# Patient Record
Sex: MALE | Race: WHITE | NOT HISPANIC OR LATINO | Employment: FULL TIME | URBAN - METROPOLITAN AREA
[De-identification: names, ages, dates, MRNs, and addresses within clinical notes are randomized per-mention and may not be internally consistent; named-entity substitution may affect disease eponyms.]

---

## 2017-10-18 ENCOUNTER — HOSPITAL ENCOUNTER (OUTPATIENT)
Dept: FAMILY MEDICINE CLINIC | Facility: CLINIC | Age: 62
Setting detail: SPECIMEN
Discharge: HOME | End: 2017-10-18
Attending: INTERNAL MEDICINE | Admitting: INTERNAL MEDICINE

## 2017-10-18 LAB
ALBUMIN SERPL-MCNC: 4.1 G/DL (ref 3.5–4.8)
ALBUMIN/GLOB SERPL: 1.8 {RATIO} (ref 1–1.7)
ALP SERPL-CCNC: 51 IU/L (ref 32–91)
ALT SERPL-CCNC: 23 IU/L (ref 17–63)
ANION GAP SERPL CALC-SCNC: 11.4 MMOL/L (ref 10–20)
AST SERPL-CCNC: 20 IU/L (ref 15–41)
BILIRUB SERPL-MCNC: 0.9 MG/DL (ref 0.3–1.2)
BUN SERPL-MCNC: 11 MG/DL (ref 8–20)
BUN/CREAT SERPL: 10 (ref 6.2–20.3)
CALCIUM SERPL-MCNC: 9.7 MG/DL (ref 8.9–10.3)
CHLORIDE SERPL-SCNC: 103 MMOL/L (ref 101–111)
CHOLEST SERPL-MCNC: 188 MG/DL
CHOLEST/HDLC SERPL: 3.7 {RATIO}
CK SERPL-CCNC: 76 IU/L (ref 49–397)
CONV CO2: 31 MMOL/L (ref 22–32)
CONV LDL CHOLESTEROL DIRECT: 109 MG/DL (ref 0–100)
CONV TOTAL PROTEIN: 6.4 G/DL (ref 6.1–7.9)
CREAT UR-MCNC: 1.1 MG/DL (ref 0.7–1.2)
GLOBULIN UR ELPH-MCNC: 2.3 G/DL (ref 2.5–3.8)
GLUCOSE SERPL-MCNC: 105 MG/DL (ref 65–99)
HDLC SERPL-MCNC: 51 MG/DL
LDLC/HDLC SERPL: 2.1 {RATIO}
LIPID INTERPRETATION: ABNORMAL
POTASSIUM SERPL-SCNC: 4.4 MMOL/L (ref 3.6–5.1)
SODIUM SERPL-SCNC: 141 MMOL/L (ref 136–144)
TRIGL SERPL-MCNC: 164 MG/DL
VIT B12 SERPL-MCNC: 316 PG/ML (ref 180–914)
VLDLC SERPL CALC-MCNC: 28.8 MG/DL

## 2018-04-16 ENCOUNTER — HOSPITAL ENCOUNTER (OUTPATIENT)
Dept: FAMILY MEDICINE CLINIC | Facility: CLINIC | Age: 63
Setting detail: SPECIMEN
Discharge: HOME | End: 2018-04-16
Attending: INTERNAL MEDICINE | Admitting: INTERNAL MEDICINE

## 2018-04-16 LAB
ALBUMIN SERPL-MCNC: 4 G/DL (ref 3.5–4.8)
ALBUMIN/GLOB SERPL: 1.5 {RATIO} (ref 1–1.7)
ALP SERPL-CCNC: 49 IU/L (ref 32–91)
ALT SERPL-CCNC: 45 IU/L (ref 17–63)
ANION GAP SERPL CALC-SCNC: 10 MMOL/L (ref 10–20)
AST SERPL-CCNC: 31 IU/L (ref 15–41)
BASOPHILS # BLD AUTO: 0 10*3/UL (ref 0–0.2)
BASOPHILS NFR BLD AUTO: 1 % (ref 0–2)
BILIRUB SERPL-MCNC: 0.5 MG/DL (ref 0.3–1.2)
BUN SERPL-MCNC: 14 MG/DL (ref 8–20)
BUN/CREAT SERPL: 14 (ref 6.2–20.3)
CALCIUM SERPL-MCNC: 9.3 MG/DL (ref 8.9–10.3)
CHLORIDE SERPL-SCNC: 106 MMOL/L (ref 101–111)
CHOLEST SERPL-MCNC: 165 MG/DL
CHOLEST/HDLC SERPL: 3.5 {RATIO}
CONV CO2: 28 MMOL/L (ref 22–32)
CONV LDL CHOLESTEROL DIRECT: 86 MG/DL (ref 0–100)
CONV TOTAL PROTEIN: 6.6 G/DL (ref 6.1–7.9)
CREAT UR-MCNC: 1 MG/DL (ref 0.7–1.2)
DIFFERENTIAL METHOD BLD: (no result)
EOSINOPHIL # BLD AUTO: 0.1 10*3/UL (ref 0–0.3)
EOSINOPHIL # BLD AUTO: 2 % (ref 0–3)
ERYTHROCYTE [DISTWIDTH] IN BLOOD BY AUTOMATED COUNT: 12.3 % (ref 11.5–14.5)
GLOBULIN UR ELPH-MCNC: 2.6 G/DL (ref 2.5–3.8)
GLUCOSE SERPL-MCNC: 98 MG/DL (ref 65–99)
HCT VFR BLD AUTO: 41.9 % (ref 40–54)
HDLC SERPL-MCNC: 47 MG/DL
HGB BLD-MCNC: 14.1 G/DL (ref 14–18)
IRON SERPL-MCNC: 41 UG/DL (ref 45–182)
LDLC/HDLC SERPL: 1.8 {RATIO}
LIPID INTERPRETATION: ABNORMAL
LYMPHOCYTES # BLD AUTO: 1.3 10*3/UL (ref 0.8–4.8)
LYMPHOCYTES NFR BLD AUTO: 27 % (ref 18–42)
MCH RBC QN AUTO: 30.7 PG (ref 26–32)
MCHC RBC AUTO-ENTMCNC: 33.7 G/DL (ref 32–36)
MCV RBC AUTO: 91.1 FL (ref 80–94)
MONOCYTES # BLD AUTO: 0.6 10*3/UL (ref 0.1–1.3)
MONOCYTES NFR BLD AUTO: 12 % (ref 2–11)
NEUTROPHILS # BLD AUTO: 2.9 10*3/UL (ref 2.3–8.6)
NEUTROPHILS NFR BLD AUTO: 58 % (ref 50–75)
NRBC BLD AUTO-RTO: 0 /100{WBCS}
NRBC/RBC NFR BLD MANUAL: 0 10*3/UL
PLATELET # BLD AUTO: 182 10*3/UL (ref 150–450)
PMV BLD AUTO: 8.5 FL (ref 7.4–10.4)
POTASSIUM SERPL-SCNC: 4 MMOL/L (ref 3.6–5.1)
RBC # BLD AUTO: 4.6 10*6/UL (ref 4.6–6)
SODIUM SERPL-SCNC: 140 MMOL/L (ref 136–144)
TRIGL SERPL-MCNC: 116 MG/DL
VLDLC SERPL CALC-MCNC: 31.6 MG/DL
WBC # BLD AUTO: 4.9 10*3/UL (ref 4.5–11.5)

## 2018-11-09 ENCOUNTER — HOSPITAL ENCOUNTER (OUTPATIENT)
Dept: FAMILY MEDICINE CLINIC | Facility: CLINIC | Age: 63
Setting detail: SPECIMEN
Discharge: HOME | End: 2018-11-09
Attending: INTERNAL MEDICINE | Admitting: INTERNAL MEDICINE

## 2018-11-09 LAB
ALBUMIN SERPL-MCNC: 4.1 G/DL (ref 3.5–4.8)
ALBUMIN/GLOB SERPL: 1.5 {RATIO} (ref 1–1.7)
ALP SERPL-CCNC: 54 IU/L (ref 32–91)
ALT SERPL-CCNC: 21 IU/L (ref 17–63)
ANION GAP SERPL CALC-SCNC: 10 MMOL/L (ref 10–20)
AST SERPL-CCNC: 21 IU/L (ref 15–41)
BASOPHILS # BLD AUTO: 0.1 10*3/UL (ref 0–0.2)
BASOPHILS NFR BLD AUTO: 1 % (ref 0–2)
BILIRUB SERPL-MCNC: 1.2 MG/DL (ref 0.3–1.2)
BUN SERPL-MCNC: 13 MG/DL (ref 8–20)
BUN/CREAT SERPL: 13 (ref 6.2–20.3)
CALCIUM SERPL-MCNC: 9.4 MG/DL (ref 8.9–10.3)
CHLORIDE SERPL-SCNC: 103 MMOL/L (ref 101–111)
CHOLEST SERPL-MCNC: 181 MG/DL
CHOLEST/HDLC SERPL: 4.2 {RATIO}
CONV CO2: 31 MMOL/L (ref 22–32)
CONV LDL CHOLESTEROL DIRECT: 102 MG/DL (ref 0–100)
CONV TOTAL PROTEIN: 6.9 G/DL (ref 6.1–7.9)
CREAT UR-MCNC: 1 MG/DL (ref 0.7–1.2)
DIFFERENTIAL METHOD BLD: (no result)
EOSINOPHIL # BLD AUTO: 0.1 10*3/UL (ref 0–0.3)
EOSINOPHIL # BLD AUTO: 2 % (ref 0–3)
ERYTHROCYTE [DISTWIDTH] IN BLOOD BY AUTOMATED COUNT: 12.5 % (ref 11.5–14.5)
GLOBULIN UR ELPH-MCNC: 2.8 G/DL (ref 2.5–3.8)
GLUCOSE SERPL-MCNC: 96 MG/DL (ref 65–99)
HCT VFR BLD AUTO: 42.6 % (ref 40–54)
HDLC SERPL-MCNC: 43 MG/DL
HGB BLD-MCNC: 14.7 G/DL (ref 14–18)
IRON SERPL-MCNC: 92 UG/DL (ref 45–182)
LDLC/HDLC SERPL: 2.4 {RATIO}
LIPID INTERPRETATION: ABNORMAL
LYMPHOCYTES # BLD AUTO: 1.8 10*3/UL (ref 0.8–4.8)
LYMPHOCYTES NFR BLD AUTO: 31 % (ref 18–42)
MCH RBC QN AUTO: 31.5 PG (ref 26–32)
MCHC RBC AUTO-ENTMCNC: 34.6 G/DL (ref 32–36)
MCV RBC AUTO: 91.1 FL (ref 80–94)
MONOCYTES # BLD AUTO: 0.5 10*3/UL (ref 0.1–1.3)
MONOCYTES NFR BLD AUTO: 8 % (ref 2–11)
NEUTROPHILS # BLD AUTO: 3.3 10*3/UL (ref 2.3–8.6)
NEUTROPHILS NFR BLD AUTO: 58 % (ref 50–75)
NRBC BLD AUTO-RTO: 0 /100{WBCS}
NRBC/RBC NFR BLD MANUAL: 0 10*3/UL
PLATELET # BLD AUTO: 243 10*3/UL (ref 150–450)
PMV BLD AUTO: 9 FL (ref 7.4–10.4)
POTASSIUM SERPL-SCNC: 4 MMOL/L (ref 3.6–5.1)
RBC # BLD AUTO: 4.67 10*6/UL (ref 4.6–6)
SODIUM SERPL-SCNC: 140 MMOL/L (ref 136–144)
TRIGL SERPL-MCNC: 194 MG/DL
VLDLC SERPL CALC-MCNC: 35.6 MG/DL
WBC # BLD AUTO: 5.8 10*3/UL (ref 4.5–11.5)

## 2019-05-17 ENCOUNTER — HOSPITAL ENCOUNTER (OUTPATIENT)
Dept: FAMILY MEDICINE CLINIC | Facility: CLINIC | Age: 64
Setting detail: SPECIMEN
Discharge: HOME | End: 2019-05-17
Attending: INTERNAL MEDICINE | Admitting: INTERNAL MEDICINE

## 2019-05-17 LAB
ALBUMIN SERPL-MCNC: 4.2 G/DL (ref 3.5–4.8)
ALBUMIN/GLOB SERPL: 1.7 {RATIO} (ref 1–1.7)
ALP SERPL-CCNC: 47 IU/L (ref 32–91)
ALT SERPL-CCNC: 30 IU/L (ref 17–63)
ANION GAP SERPL CALC-SCNC: 16.2 MMOL/L (ref 10–20)
AST SERPL-CCNC: 28 IU/L (ref 15–41)
BILIRUB SERPL-MCNC: 1 MG/DL (ref 0.3–1.2)
BUN SERPL-MCNC: 16 MG/DL (ref 8–20)
BUN/CREAT SERPL: 16 (ref 6.2–20.3)
CALCIUM SERPL-MCNC: 9.3 MG/DL (ref 8.9–10.3)
CHLORIDE SERPL-SCNC: 104 MMOL/L (ref 101–111)
CHOLEST SERPL-MCNC: 169 MG/DL
CHOLEST/HDLC SERPL: 3.9 {RATIO}
CONV CO2: 24 MMOL/L (ref 22–32)
CONV LDL CHOLESTEROL DIRECT: 105 MG/DL (ref 0–100)
CONV TOTAL PROTEIN: 6.7 G/DL (ref 6.1–7.9)
CREAT UR-MCNC: 1 MG/DL (ref 0.7–1.2)
GLOBULIN UR ELPH-MCNC: 2.5 G/DL (ref 2.5–3.8)
GLUCOSE SERPL-MCNC: 101 MG/DL (ref 65–99)
HDLC SERPL-MCNC: 44 MG/DL
LDLC/HDLC SERPL: 2.4 {RATIO}
LIPID INTERPRETATION: ABNORMAL
POTASSIUM SERPL-SCNC: 4.2 MMOL/L (ref 3.6–5.1)
PSA SERPL-MCNC: 0.61 NG/ML (ref 0–4)
SODIUM SERPL-SCNC: 140 MMOL/L (ref 136–144)
TRIGL SERPL-MCNC: 132 MG/DL
VLDLC SERPL CALC-MCNC: 19.9 MG/DL

## 2019-05-24 ENCOUNTER — CONVERSION ENCOUNTER (OUTPATIENT)
Dept: FAMILY MEDICINE CLINIC | Facility: CLINIC | Age: 64
End: 2019-05-24

## 2019-06-04 VITALS
HEART RATE: 74 BPM | DIASTOLIC BLOOD PRESSURE: 72 MMHG | RESPIRATION RATE: 12 BRPM | HEIGHT: 67 IN | SYSTOLIC BLOOD PRESSURE: 108 MMHG | WEIGHT: 171 LBS | OXYGEN SATURATION: 97 % | BODY MASS INDEX: 26.84 KG/M2

## 2019-06-06 NOTE — PROGRESS NOTES
Vital Signs:    Patient Profile:    63 Years Old Male  Height:     67 inches  Weight:     171.0 pounds  BMI:        26.78     O2 Sat:     97 %  Temp:       98.5 degrees F oral  Pulse rate: 74 / minute  Resp:       12 per minute  BP Sittin / 72  (left arm)    Cuff size:  regular      Problems: Active problems were reviewed with the patient during this visit.  Medications: Medications were reviewed with the patient during this visit.  Allergies: Allergies were reviewed with the patient during this visit.  No Known Allergy.        Vitals Entered By: Heather Caicedo CMA (May 24, 2019 3:12 PM)      Primary Provider:  Chayito Freedman    CC:  6 mos f/u of hpld.    History of Present Illness:  here to f/u lab results         The patient comes in today for regular hyperlipidemia follow-up.  The patient denies muscle aches, constipation, diarrhea, GI upset and fatigue.  Patient denies any occurrences of chest pain/pressure, exercise intolerance, dyspnea, palpitations,   syncope and pedal edema.  Medication compliance noted as near 100%.  Dietary compliance since last visit noted as good.      also wife notes trouble with snoring  doesn't think he stops breathing    at last visit, had increased omeprazole to 40mg  sometimes still gets breakthrough  parish if he eats late or eats a large portion      Past Medical History:     Reviewed history from 2016 and no changes required:        Hyperlipidemia        E.D.        Iron deficiency        Palpitations (Stress treadmill test (summer 2015))        Cervical DJD        GERD                colonoscopy - 2015, +1-2 polyps; due q5 years ?    Past Surgical History:     Reviewed history from 2016 and no changes required:        none    Family History Summary:      Reviewed history Last on 2018 and no changes required:2019  Brother - Has Family History of High Cholesterol - Entered On: 2016  Sister - Has Family History of Other Medical Problems - bowel  problems - Entered On: 4/27/2016  Father - Has Family History of Heart Disease - Entered On: 4/27/2016    General Comments - FH:  Mother: healthy  Children: 2 - healthy  Siblings: 5 total - 1 sister healthy, 2 brothers healthy    Denies FH colon or prostate cancers    Social History:     Reviewed history from 04/25/2016 and no changes required:        Marital status:         Occupation:         Living arrangements: lives with wife        Risk Factors:     Smoked Tobacco Use:  Never smoker  Smokeless Tobacco Use:  Never  Drug use:  no  Alcohol use:  yes     Type:  beer    Previous Tobacco Use: Signed On - 11/28/2018  Smoked Tobacco Use:  Never smoker  Smokeless Tobacco Use:  Never  Drug use:  no    Previous Alcohol Use: Signed On - 11/28/2018  Alcohol use:  yes     Type:  beer    Colonoscopy History:     Date of Last Colonoscopy:  12/01/2015        Physical Exam   Height:  67  Weight:  170.8  BP:  108/72 mm HG    Medication List:  SILDENAFIL CITRATE 20 MG ORAL TABLET (SILDENAFIL CITRATE) 1-5 po pre intercourse  VIAGRA 100 MG ORAL TABLET (SILDENAFIL CITRATE) one prn as directed  SIMVASTATIN 20 MG ORAL TABLET (SIMVASTATIN) 1/2 tab daily  OMEPRAZOLE 40 MG ORAL CAPSULE DELAYED RELEASE (OMEPRAZOLE) one cap daily  CENTRUM SILVER ULTRA MENS ORAL TABLET (MULTIPLE VITAMINS-MINERALS) 1poqd  CHONDROITIN SULFATE CAPSULE (CHONDROITIN SULFATE A CAPS) 1pobid 600mg caps      Surgical History   none,    Risk Factors  Tobacco Use: Never smoker  Illicit Drug use: no      Physical Examination   General Appearance   In no acute distress.  Alert & oriented.  Behavior and affect appropriate to situation  HEENT   PERRLA, EOMI, TM's normal.  Pharynx clear  Cardiovascular   Regular rate and rhythm  Lungs   Clear to auscultation        Impression & Recommendations:    Problem # 1:  HYPERLIPIDEMIA (ICD-272.4) (VTS51-I26.5)  Assessment: Improved  better this time  if still these labs in 6 mo, may consider dc statin  but if back  up, then will likely continue  His updated medication list for this problem includes:     Simvastatin 20 Mg Oral Tablet (Simvastatin) ..... 1/2 tab daily      Problem # 2:  GERD (ICD-530.81) (OYQ14-T51.9)  Assessment: Deteriorated  discussed pros/cons of staying on ppi vs switching to h2 blocker  at this point, if he still gets breakthrough with ppi, likely will not be controlled with h2 blocker  discussed risk of b12 deficiency and renal disease and dementia with ppi  vs taking h2 blocker and being more strict with diet and portion size  pt elects to stay on 40mg of ppi at this time    Problem # 3:  Snoring (ICD-786.09) (EIT43-K50.83)  Assessment: New  refer to sleep medicine    Medications Added to Medication List This Visit:  1)  Simvastatin 20 Mg Oral Tablet (Simvastatin) .... 1/2 tab daily      Patient Instructions:  1)  rtc 6 mo for physical with labs 1 week prior  2)  refilled meds                Medication Administration    Orders Added:  1)  Ofc Vst, Est Level III [72820]          Medications:  SIMVASTATIN 20 MG ORAL TABLET (SIMVASTATIN) 1/2 tab daily  #45[Tablet] x 3      Entered and Authorized by:  Chayito Freedman      Electronically signed by:   Chayito Freedman on 05/24/2019      Method used:    Electronically to               [facility]* (mail-order)              [address] [place]  [address]              Ph: [phone]              Fax: [phone]      Note to Pharmacy: Route: ORAL;       RxID:   [id number]  OMEPRAZOLE 40 MG ORAL CAPSULE DELAYED RELEASE (OMEPRAZOLE) one cap daily  #90[Capsule] x 3      Entered and Authorized by:  Chayito Freedman      Electronically signed by:   Chayito Freedman on 05/24/2019      Method used:    Electronically to               [facility]* (mail-order)              [address] [place]  [address]              Ph: [phone]              Fax: [phone]      RxID:    [id number]  SILDENAFIL CITRATE 20 MG ORAL TABLET (SILDENAFIL CITRATE) 1-5 po pre intercourse  #50[Tablet] x 5      Entered and Authorized by:  Chayito Freedman      Electronically signed by:   Chayito Freedman on 05/24/2019      Method used:    Electronically to               [facility]* (retail)              [address] [place] [address]  [address]              Ph: [phone]              Fax: [phone]      RxID:   [id number]          Electronically signed by Chayito Freedman on 05/24/2019 at 6:53 PM  ________________________________________________________________________       Disclaimer: Converted Note message may not contain all data elements that existed in the [employer] source system. Please see [employer] for the original note details.

## 2019-07-29 ENCOUNTER — OFFICE VISIT (OUTPATIENT)
Dept: SLEEP MEDICINE | Facility: HOSPITAL | Age: 64
End: 2019-07-29

## 2019-07-29 VITALS
HEIGHT: 67 IN | WEIGHT: 172.4 LBS | BODY MASS INDEX: 27.06 KG/M2 | DIASTOLIC BLOOD PRESSURE: 70 MMHG | SYSTOLIC BLOOD PRESSURE: 109 MMHG | OXYGEN SATURATION: 98 % | HEART RATE: 76 BPM

## 2019-07-29 DIAGNOSIS — G47.30 SLEEP APNEA, UNSPECIFIED TYPE: Primary | ICD-10-CM

## 2019-07-29 PROCEDURE — G0463 HOSPITAL OUTPT CLINIC VISIT: HCPCS

## 2019-07-29 RX ORDER — OMEPRAZOLE 40 MG/1
40 CAPSULE, DELAYED RELEASE ORAL DAILY
COMMUNITY
End: 2019-11-09 | Stop reason: SDUPTHER

## 2019-07-29 RX ORDER — SIMVASTATIN 10 MG
10 TABLET ORAL NIGHTLY
COMMUNITY
End: 2019-12-09 | Stop reason: SDUPTHER

## 2019-07-29 RX ORDER — SILDENAFIL 100 MG/1
100 TABLET, FILM COATED ORAL DAILY PRN
COMMUNITY
End: 2019-12-09

## 2019-07-29 NOTE — PROGRESS NOTES
"  SLEEP MEDICINE CONSULT      Patient Identification:     Name: Sourav Proctor   Age: 63 y.o.   Gender: male   : 1955   MRN: [id number]     Date of consult: 2019    PCP/Referring Physician(s):     PCP: Chayito Freedman MD  Referring Provider: Provider, No Known      Chief Complaint:     Nocturnal snores associated with daytime easy fatigability and hypersomnolence.  History of Present Illness:   This is a very pleasant gentleman who has snored for a long time.  Snores has become much more loud and disruptive so much so that he has to move to a different bedroom.  His wife has not noticed any breathing difficulties except that he has been breathing \"heavy\".  The patient suspects that he has sleep apnea.  He would prefer a home study.    His bedtime nowadays is 10 PM.  Takes him less than 15 minutes to fall asleep.  He usually wakes up at 5 in the morning to start his day.  He watches TV in bed in order to relax and dozes off.  He has woken up 1-3 times a night.  Sometimes makes a trip to the bathroom.  He usually 3 times per week.  Any fluctuation in temperature in his bedroom has disturbed his sleep.  He has sometimes woken up with \"soreness of hips\".  He has often woken up with with heartburn.  Denies any symptoms of headache or sinus congestion at night on the morning.  He denies symptoms of night sweats.  He does not drool in his sleep.  He denies any symptoms of dry mouth at night or in the morning.  No nasal congestion at night or in the morning.  He has never woken up from his own snores.    He occasionally has difficult time falling asleep at night or staying asleep.  Sometimes he has sleepless nights PA.  Usually once or twice per week.  He attributes his insomnia to his mind that continues to work.  He finds it difficult to shut his mind off.  Has taken over-the-counter Benadryl 2-3 times per week in order to fall asleep.    He is a restless sleeper toss and turn at night.  Denies any " symptoms of nightmares.  He has vivid dreams.  He has never talked about in his sleep.  He is a restless sleeper.  He denies any symptoms of bruxism at night or in the morning leg cramps at night or in the morning.  His legs does not twitch at night.  He has felt the soles of his feet warm/hot at night.  He denies any symptoms of hypnopompic or hypnagogic hallucinations.    He has woken up in the morning somewhat tired and somnolent.  He likes to drink 2 or 3 cups of coffee in the morning.  Iced tea during the daytime.  He tends to doze off during sedentary activities.  There is a high chance of dozing off if he is sitting and reading or watching TV.  He can easily doze off if he is a passenger in a car for an hour without a break or laying down to rest when circumstances permit him to do so.  There is a minimal chance of dozing off if he sitting inactive in a public place.  Or sitting quietly after lunch.  His Frakes sleepiness score is 14/24.    Allergies, Medications, and Past History:   Allergies:    Allergies no known allergies  Current Medications:    Prior to Admission medications    Medication Sig Start Date End Date Taking? Authorizing Provider   omeprazole (priLOSEC) 40 MG capsule Take 40 mg by mouth Daily.   Yes Clinton Iyer MD   sildenafil (VIAGRA) 100 MG tablet Take 100 mg by mouth Daily As Needed for erectile dysfunction.   Yes Clinton Iyer MD   simvastatin (ZOCOR) 10 MG tablet Take 10 mg by mouth Every Night.   Yes Clinton Iyer MD     Past Medical History:    Past Medical History:   Diagnosis Date   • Anemia, iron deficiency    • DJD (degenerative joint disease) of cervical spine    • GERD (gastroesophageal reflux disease)    • Hypercholesterolemia       Past Surgical History:    History reviewed. No pertinent surgical history.   Social History:    Social History     Tobacco Use   • Smoking status: Never Smoker   • Smokeless tobacco: Never Used   Substance Use Topics   •  "Alcohol use: Not on file   • Drug use: Not on file     Family Medical History:  No family history on file.      Review of Systems:   Constitutional:  Denies fever or chills and weight gain  Eyes:  Denies change in visual acuity   HENT:  Denies nasal congestion, sore throat or post nasal drainage   Respiratory:  Denies cough, shortness of breath or dyspnea on exertion    Cardiovascular:  Denies chest pain or edema   GI:  Denies abdominal pain, nausea, vomiting, bloody stools or diarrhea   :  Denies dysuria or nocturia   Musculoskeletal:  Denies back pain or joint pain   Integument:  Denies rash   Neurologic:  Denies headache, focal weakness or sensory changes. No history of stroke or seizures.   Endocrine:  Denies polyuria or polydipsia   Lymphatic:  Denies swollen glands   Psychiatric:  Denies depression, anxiety or claustrophobia      Physical Exam:     Vitals:    07/29/19 1447   BP: 109/70   Pulse: 76   SpO2: 98%   Weight: 78.2 kg (172 lb 6.4 oz)   Height: 170.2 cm (67\")     HEENT: Head is normocephalic, atraumatic, normal distribution of hair. Pupils reactive to light. Ocular movements intact. mild nasal congestion on sniff test. No deviated nasal septum. No micrognathia.  Throat: Mallapatti stage 4, tongue midline, mucosa moist.  Neck: no JVD, thyromegaly, mass, +midline trachea, Neck circumference 15 inches  Lungs: clear, no wheeze, rhonchi, crackles  Cardiovascular: S1, S2, RRR no murmurs, rubs or gallops  Abd: +BS's soft NT/ND, no CVA tenderness.    Ext: no edema, cyanosis, clubbing, pulses intact  Neuro: Awake alert, oriented to time place and person. Grossly intact to motor, sensory cerebral, and cerebellar (without focal deficit)  Psych: No overt rolly, depression, psychosis or inappropriate behavior  Skin: No rash, cellulitis, or ulcerations.  No facial rash or erosions      Assessment/Plan:    sleep apnea unspecified:  This is a very pleasant gentleman who has snored for a long time.  He has been " "breathing \"heavy\" at night.  He has sometimes woken up with a dry mouth.  He has felt tired and somnolent during the daytime.  Obstructive sleep apnea has to be ruled out.  He wants an attended polysomnogram.  Recommendation . an unattended polysomnogram is recommended.    2.  Allergic rhinitis.  He has remained symptomatic.  Recommendation.  Aggressive therapy is recommended.    3.  Sleep onset and sleep maintenance insomnia most likely related to underlying anxiety/depression.  Recommendation.  Aggressive therapy for underlying factors is recommended in order to control the symptoms of insomnia.    Driving instructions. Patient is advised to avoid driving if tired or somnolent. To avoid all alcoholic beverages or medications causing somnolence.         Diagnosis Plan   1. Sleep apnea, unspecified type  Home Sleep Study     No orders of the defined types were placed in this encounter.    Orders Placed This Encounter   Procedures   • Home Sleep Study     Standing Status:   Future     Standing Expiration Date:   7/29/2020     Order Specific Question:   May take own meds     Answer:   Yes     Order Specific Question:   If any contraindications for HST are checked, patient may be recommended for in-lab testing. HST is indicated for patients in whom TREY is  suspected diagnosis.     Answer:   Does not apply         Raina Keith MD  7/29/2019  3:13 PM    "

## 2019-08-05 ENCOUNTER — HOSPITAL ENCOUNTER (OUTPATIENT)
Dept: SLEEP MEDICINE | Facility: HOSPITAL | Age: 64
Discharge: HOME | End: 2019-08-05
Admitting: INTERNAL MEDICINE

## 2019-08-05 VITALS — BODY MASS INDEX: 27 KG/M2 | HEIGHT: 67 IN | WEIGHT: 172 LBS

## 2019-08-05 DIAGNOSIS — G47.30 SLEEP APNEA, UNSPECIFIED TYPE: ICD-10-CM

## 2019-08-05 PROCEDURE — 95806 SLEEP STUDY UNATT&RESP EFFT: CPT

## 2019-08-12 DIAGNOSIS — N52.9 ERECTILE DYSFUNCTION, UNSPECIFIED ERECTILE DYSFUNCTION TYPE: Primary | ICD-10-CM

## 2019-08-15 ENCOUNTER — TELEPHONE (OUTPATIENT)
Dept: SLEEP MEDICINE | Facility: HOSPITAL | Age: 64
End: 2019-08-15

## 2019-09-04 ENCOUNTER — OFFICE VISIT (OUTPATIENT)
Dept: SLEEP MEDICINE | Facility: HOSPITAL | Age: 64
End: 2019-09-04

## 2019-09-04 VITALS
DIASTOLIC BLOOD PRESSURE: 77 MMHG | WEIGHT: 171 LBS | SYSTOLIC BLOOD PRESSURE: 116 MMHG | BODY MASS INDEX: 26.84 KG/M2 | RESPIRATION RATE: 20 BRPM | HEIGHT: 67 IN | OXYGEN SATURATION: 97 % | HEART RATE: 79 BPM

## 2019-09-04 DIAGNOSIS — G47.33 OBSTRUCTIVE SLEEP APNEA, ADULT: Primary | ICD-10-CM

## 2019-09-04 PROCEDURE — G0463 HOSPITAL OUTPT CLINIC VISIT: HCPCS

## 2019-09-04 NOTE — PROGRESS NOTES
"  SLEEP MEDICINE CONSULT      Patient Identification:     Name: Sourav Proctor   Age: 63 y.o.   Gender: male   : 1955   MRN: [id number]     Date of consult: 2019    PCP/Referring Physician(s):     PCP: Chayiot Freemdan MD  Referring Provider: Raina Keith MD      Chief Complaint:     Follow-up after an   inattended polysomnogram.  History of Present Illness:   This is a very pleasant gentleman who has snored for a long time.  Snores has become much more loud and disruptive so much so that he has to move to a different bedroom.  His wife has not noticed any breathing difficulties except that he has been breathing \"heavy\".  He underwent an unattended polysomnogram.  He here today to discuss the results of the study.      His usual bedtime  is 10 PM.  Takes him less than 15 minutes to fall asleep.  He takes BENADRYL as a sleeping aid.  He slept poorly on the night of the study.  He did not take his sleeping aid.  He woke up at 1:30 AM and stayed up till 3:30 AM.  He stayed in bed with his monitoring equipment in place. He was very restless on the night of study. He tossed  And turned through out the night.  He woke up at 5 in the morning to start his day.      He has  woke up in morning tired and somewhat somnolent.  He has sometimes dozed off during sedentary activities.  He has felt much better after 10 to 15 minutes nap after work.  His concentration sometimes lapses at work.  He denies any symptoms of depression or anxiety though he finds it difficult to sleep at night.      Allergies, Medications, and Past History:   Allergies:    No Known Allergies  Current Medications:    Prior to Admission medications    Medication Sig Start Date End Date Taking? Authorizing Provider   omeprazole (priLOSEC) 40 MG capsule Take 40 mg by mouth Daily.   Yes Clinton Iyer MD   sildenafil (VIAGRA) 100 MG tablet Take 100 mg by mouth Daily As Needed for erectile dysfunction.   Yes Clinton Iyer MD " "  simvastatin (ZOCOR) 10 MG tablet Take 10 mg by mouth Every Night.   Yes Provider, Clinton, MD     Past Medical History:    Past Medical History:   Diagnosis Date   • Anemia, iron deficiency    • DJD (degenerative joint disease) of cervical spine    • GERD (gastroesophageal reflux disease)    • Hypercholesterolemia       Past Surgical History:    No past surgical history on file.   Social History:    Social History     Tobacco Use   • Smoking status: Never Smoker   • Smokeless tobacco: Never Used   Substance Use Topics   • Alcohol use: Not on file   • Drug use: Not on file     Family Medical History:  History reviewed. No pertinent family history.      Review of Systems:   Constitutional:  Denies fever or chills and weight gain  Eyes:  Denies change in visual acuity   HENT:  Denies nasal congestion, sore throat or post nasal drainage   Respiratory:  Denies cough, shortness of breath or dyspnea on exertion    Cardiovascular:  Denies chest pain or edema   GI:  Denies abdominal pain, nausea, vomiting, bloody stools or diarrhea   :  Denies dysuria or nocturia   Musculoskeletal:  Denies back pain or joint pain   Integument:  Denies rash   Neurologic:  Denies headache, focal weakness or sensory changes. No history of stroke or seizures.   Endocrine:  Denies polyuria or polydipsia   Lymphatic:  Denies swollen glands   Psychiatric:  Denies depression, anxiety or claustrophobia      Physical Exam:     Vitals:    09/04/19 1516   BP: 116/77   Pulse: 79   Resp: 20   SpO2: 97%   Weight: 77.6 kg (171 lb)   Height: 170.2 cm (67\")     HEENT: Head is normocephalic, atraumatic, normal distribution of hair. Pupils reactive to light. Ocular movements intact. mild nasal congestion on sniff test. No deviated nasal septum. No micrognathia.  Throat: Mallapatti stage 4, tongue midline, mucosa moist.  Neck: no JVD, thyromegaly, mass, +midline trachea, Neck circumference 15 inches  Lungs: clear, no wheeze, rhonchi, " crackles  Cardiovascular: S1, S2, RRR no murmurs, rubs or gallops  Abd: +BS's soft NT/ND, no CVA tenderness.    Ext: no edema, cyanosis, clubbing, pulses intact  Neuro: Awake alert, oriented to time place and person. Grossly intact to motor, sensory cerebral, and cerebellar (without focal deficit)  Psych: No overt rolly, depression, psychosis or inappropriate behavior  Skin: No rash, cellulitis, or ulcerations.  No facial rash or erosions      Assessment/Plan:   Obstructive sleep apnea:  This is a very pleasant gentleman who has snored for a long time.  He underwent unattended polysomnogram.  He was very restless on the night of the study.  He took his sleeping aid on the night of the study.  He tossed and turned during the night.  He stayed awake for more than 2 hours on the night of the study with the monitoring equipment in place.  He has felt tired and somnolent during the daytime.      The home study may underrepresent the severity of obstructive sleep apnea due to unaccounted periods of wakefulness during the night of study.  Results of the study was discussed in detail with the patient all questions were answered.  Different options of therapy discussed including CPAP/BiPAP titration.  To repeat a home study or empirically use of a CPAP mask.  The patient opted for an empirical use of CPAP mask.    Applications of untreated obstructive sleep apnea were also discussed including increased risk of stroke, coronary artery disease and presence of cardiac arrhythmias.  Possibility of dying and sleep.  Increased risk of pulmonary as well as systemic hypertension.  Short-term complications includes daytime hypersomnolence with possibility of dozing off during sedentary activities such as driving with all attendant complications.  Decreased concentration.  Issues with memory.  Mood issues.  Recommendation .  An auto CPAP mode of therapy is recommended with a pressure range between 5-15 CWP.  He may benefit from a full  facemask but he may use any mask which she finds comfortable.  He is advised to use the mask with given pressures a nightly basis.  He is advised to use the mask with given pressures for at least 4 hours a minimum benefit or as long as he sleeps for maximum benefit.  He is advised to use the mask with given pressures if he takes a nap during the daytime.  2.  Allergic rhinitis.  He has remained symptomatic.  Recommendation.  Aggressive therapy is recommended.    3.  Sleep onset and sleep maintenance insomnia most likely related to underlying anxiety/depression.  Recommendation.  Aggressive therapy for underlying factors is recommended in order to control the symptoms of insomnia.    Driving instructions. Patient is advised to avoid driving if tired or somnolent. To avoid all alcoholic beverages or medications causing somnolence.         Diagnosis Plan   1. Obstructive sleep apnea, adult  CPAP Therapy     No orders of the defined types were placed in this encounter.    Orders Placed This Encounter   Procedures   • CPAP Therapy     Order Specific Question:   Equipment     Answer:    CPAP     Order Specific Question:   PAP Machine Brand     Answer:   Respironics     Order Specific Question:   PAP Tubing (1 per 3 months)     Answer:    6' Standard tubing     Order Specific Question:   PAP Mask (1 per 3 months)     Answer:    Full face mask     Order Specific Question:   Mask interface (1 per month)     Answer:    Face Mask Interface     Order Specific Question:   PAP Accessories     Answer:    Water Chamber for PAP Humidifier (1 per 6 month) &  Filter PAP Disposable (2 per month) &  Filter PAP Non-Disposable (1 per 6 months) &  Chinstrap to be used with PAP (1 per 6 months) &  Headgear to be used with PAP (1 per 6 mo)     Order Specific Question:   The prescribed settings for the PAP ordered are     Answer:   auto cpap mode. pressure range from 5 to 15 cwp.     Order Specific  Question:   Does the patient have Obstructive Sleep Apnea or other qualifying diagnosis for PAP ordered?     Answer:   Yes     Order Specific Question:   Does the patient require humidification?     Answer:   Yes     Order Specific Question:   Humidifier     Answer:    Humidifier Heated for CPAP or BiPAP     Order Specific Question:   The patient requires a PAP Device & continued use of Supplies to administer effective PAP therapy at the frequency of use ordered above     Answer:   Yes     Order Specific Question:   Initial Supplies and refills authorized for 12 months?     Answer:   Yes     Order Specific Question:   The face to face evaluation was performed on     Answer:   9/4/2019     Order Specific Question:   Which [employer] company is this being sent to?     Answer:   [facility] [[id number]]     Order Specific Question:   Length of Need (99 Months = Lifetime)     Answer:   99 Months = Lifetime         Raina Keith MD  9/4/2019  4:50 PM

## 2019-11-11 RX ORDER — OMEPRAZOLE 40 MG/1
CAPSULE, DELAYED RELEASE ORAL
Qty: 90 CAPSULE | Refills: 3 | Status: SHIPPED | OUTPATIENT
Start: 2019-11-11 | End: 2019-12-09 | Stop reason: SDUPTHER

## 2019-11-13 ENCOUNTER — OFFICE VISIT (OUTPATIENT)
Dept: SLEEP MEDICINE | Facility: HOSPITAL | Age: 64
End: 2019-11-13

## 2019-11-13 VITALS
HEART RATE: 83 BPM | DIASTOLIC BLOOD PRESSURE: 78 MMHG | BODY MASS INDEX: 26.68 KG/M2 | OXYGEN SATURATION: 97 % | WEIGHT: 170 LBS | SYSTOLIC BLOOD PRESSURE: 117 MMHG | HEIGHT: 67 IN

## 2019-11-13 DIAGNOSIS — G47.33 OBSTRUCTIVE SLEEP APNEA, ADULT: Primary | ICD-10-CM

## 2019-11-13 PROCEDURE — G0463 HOSPITAL OUTPT CLINIC VISIT: HCPCS

## 2019-11-13 NOTE — PROGRESS NOTES
SLEEP MEDICINE CONSULT      Patient Identification:     Name: Sourav Proctor   Age: 63 y.o.   Gender: male   : 1955   MRN: [id number]     Date of consult: 2019    PCP/Referring Physician(s):     PCP: Chayito Freedman MD  Referring Provider: Raina Keith MD      Chief Complaint:     Obstructive sleep apnea.  Follow-up after 8 weeks for compliance.  History of Present Illness:   This is a very pleasant gentleman who underwent an unattended polysomnogram which documented presence of mild obstructive sleep apnea.  He was provided with an auto CPAP mode of therapy with a pressure range between 5-15.  He here today for initial follow-up for compliance.  Memory card has been downloaded.    He has used a full facemask which she finds somewhat uncomfortable.  He has tried nasal mask.  He found the nasal mask very uncomfortable.  He is a mouth breather.  Oral venting bothered him with the nasal mask.  Air leaks with a full facemask has woken him up at night.  Pressures are quite tolerable to him  He uses humidifier on regular basis.    He has slept much better with the mask and given pressures though his sleep is occasionally disturbed by air leaks around the mask.   His usual bedtime  is 10 PM.  Takes him less than 15 minutes to fall asleep.  He takes BENADRYL as a sleeping aid.    He tossed  And turned through out the night.  He woke up at 5 in the morning to start his day.  He denies any symptoms of headache at night or in the morning.  No heartburn at night or in the morning.  Decrease in severity of dry mouth.  Decreased symptoms of nasal congestion at night as well as in the morning.  He does not snore with the mask in place.    He has  woke up in morning awake alert and rested.  He usually does not take any naps during the working days.  He has dozed off on the weekend with his grandchild.       Allergies, Medications, and Past History:   Allergies:    No Known Allergies  Current Medications:   "  Prior to Admission medications    Medication Sig Start Date End Date Taking? Authorizing Provider   omeprazole (priLOSEC) 40 MG capsule Take 40 mg by mouth Daily.   Yes ProviderClinton MD   sildenafil (VIAGRA) 100 MG tablet Take 100 mg by mouth Daily As Needed for erectile dysfunction.   Yes Provider, MD Clinton   simvastatin (ZOCOR) 10 MG tablet Take 10 mg by mouth Every Night.   Yes Provider, MD Clinton     Past Medical History:    Past Medical History:   Diagnosis Date   • Anemia, iron deficiency    • DJD (degenerative joint disease) of cervical spine    • GERD (gastroesophageal reflux disease)    • Hypercholesterolemia    • Sleep apnea, obstructive       Past Surgical History:    No past surgical history on file.   Social History:    Social History     Tobacco Use   • Smoking status: Never Smoker   • Smokeless tobacco: Never Used   Substance Use Topics   • Alcohol use: Not on file   • Drug use: Not on file     Family Medical History:  History reviewed. No pertinent family history.      Review of Systems:   Constitutional:  Denies fever or chills and weight gain  Eyes:  Denies change in visual acuity   HENT:  Denies nasal congestion, sore throat or post nasal drainage   Respiratory:  Denies cough, shortness of breath or dyspnea on exertion    Cardiovascular:  Denies chest pain or edema   GI:  Denies abdominal pain, nausea, vomiting, bloody stools or diarrhea .  No aerophagia.  :  Denies dysuria or nocturia   Musculoskeletal:  Denies back pain or joint pain   Integument:  Denies rash   Neurologic:  Denies headache, focal weakness or sensory changes. No history of stroke or seizures.   Endocrine:  Denies polyuria or polydipsia   Lymphatic:  Denies swollen glands   Psychiatric:  Denies depression, anxiety or claustrophobia      Physical Exam:     Vitals:    11/13/19 1549   BP: 117/78   Pulse: 83   SpO2: 97%   Weight: 77.1 kg (170 lb)   Height: 170.2 cm (67\")     HEENT: Head is normocephalic, " atraumatic, normal distribution of hair. Pupils reactive to light. Ocular movements intact. mild nasal congestion on sniff test. No deviated nasal septum. No micrognathia.  Throat: Mallapatti stage 4, tongue midline, mucosa moist.  Neck: no JVD, thyromegaly, mass, +midline trachea, Neck circumference 15 inches  Lungs: clear, no wheeze, rhonchi, crackles  Cardiovascular: S1, S2, RRR no murmurs, rubs or gallops  Abd: +BS's soft NT/ND, no CVA tenderness.    Ext: no edema, cyanosis, clubbing, pulses intact  Neuro: Awake alert, oriented to time place and person. Grossly intact to motor, sensory cerebral, and cerebellar (without focal deficit)  Psych: No overt rolly, depression, psychosis or inappropriate behavior  Skin: No rash, cellulitis, or ulcerations.  No facial rash or erosions    Diagnostic data;  Memory card download shows data from 9/12/2019 to 11/12/2019.  Overall 62 days of data reviewed.  Percentage of use is 95%.  Maximum hours use 9 hours 16 minutes minimum time used 1 hour 50 minutes.  91.9% of the time the mask has been used for more than 4 hours.  The average apnea hypopnea index is 8.5 events per hour on an auto CPAP mode of therapy with a pressure range between 5-15 CWP.    Assessment/Plan:   Obstructive sleep apnea:  This is a very pleasant gentleman who underwent an unattended polysomnogram which showed presence of mild obstructive sleep apnea.  He was prescribed an auto CPAP mode of therapy.  The pressure range between 5-15 CWP.    He has remained compliant with therapy.  He is getting benefit from it.    The results of memory card download was discussed in detail with the patient.  All questions were answered.  He has difficulty tolerating the mask because of the air leaks around the mask as well as aerophagia.  The aerophagia may be due to upper airway congestion with swallowing of air.  Recommendation .  To increase the pressures of an auto CPAP mode of therapy to the  range between 8-20 CWP.  He  may benefit from a full facemask but he may use any mask which she finds comfortable.  He is advised to use the mask with given pressures a nightly basis.  He is advised to use the mask with given pressures for at least 4 hours a minimum benefit or as long as he sleeps for maximum benefit.  He is advised to use the mask with given pressures if he takes a nap during the daytime.  2.  Allergic rhinitis.  He has remained symptomatic.  His upper airway congestion is interfering with adequate therapy for obstructive sleep apnea.  Recommendation.  Aggressive therapy is recommended.   Advised to use a steroid nasal spray which he can get over-the-counter.  1 spray to each nostril at night.  He will benefit from allergy testing.    3.  Sleep onset and sleep maintenance insomnia most likely related to underlying anxiety/depression.  Recommendation.  Aggressive therapy for underlying factors is recommended in order to control the symptoms of insomnia.    Driving instructions. Patient is advised to avoid driving if tired or somnolent. To avoid all alcoholic beverages or medications causing somnolence.         Diagnosis Plan   1. Obstructive sleep apnea, adult  CPAP Therapy     No orders of the defined types were placed in this encounter.    Orders Placed This Encounter   Procedures   • CPAP Therapy     Order Specific Question:   PAP Machine Brand     Answer:   Respironics     Order Specific Question:   PAP Tubing (1 per 3 months)     Answer:    6' Standard tubing     Order Specific Question:   PAP Mask (1 per 3 months)     Answer:    Full face mask     Order Specific Question:   Mask interface (1 per month)     Answer:    Face Mask Interface     Order Specific Question:   PAP Accessories     Answer:    Water Chamber for PAP Humidifier (1 per 6 month) &  Filter PAP Disposable (2 per month) &  Filter PAP Non-Disposable (1 per 6 months) &  Chinstrap to be used with PAP (1 per 6 months) &   Headgear to be used with PAP (1 per 6 mo)     Order Specific Question:   The prescribed settings for the PAP ordered are     Answer:   please change auto-CPAP pressure to range 8 to 20 cwp.     Order Specific Question:   Does the patient have Obstructive Sleep Apnea or other qualifying diagnosis for PAP ordered?     Answer:   Yes     Order Specific Question:   Does the patient require humidification?     Answer:   Yes     Order Specific Question:   Humidifier     Answer:    Humidifier Heated for CPAP or BiPAP     Order Specific Question:   The patient requires a PAP Device & continued use of Supplies to administer effective PAP therapy at the frequency of use ordered above     Answer:   Yes     Order Specific Question:   Initial Supplies and refills authorized for 12 months?     Answer:   Yes     Order Specific Question:   The face to face evaluation was performed on     Answer:   11/13/2019     Order Specific Question:   Which [employer] company is this being sent to?     Answer:   [facility] [[id number]]     Order Specific Question:   Length of Need (99 Months = Lifetime)     Answer:   99 Months = Lifetime         Raina Keith MD  11/13/2019  4:41 PM

## 2019-11-25 ENCOUNTER — PATIENT MESSAGE (OUTPATIENT)
Dept: FAMILY MEDICINE CLINIC | Facility: CLINIC | Age: 64
End: 2019-11-25

## 2019-11-25 PROCEDURE — 80061 LIPID PANEL: CPT | Performed by: INTERNAL MEDICINE

## 2019-11-25 PROCEDURE — 85025 COMPLETE CBC W/AUTO DIFF WBC: CPT | Performed by: INTERNAL MEDICINE

## 2019-11-25 PROCEDURE — 82607 VITAMIN B-12: CPT | Performed by: INTERNAL MEDICINE

## 2019-11-25 PROCEDURE — 80053 COMPREHEN METABOLIC PANEL: CPT | Performed by: INTERNAL MEDICINE

## 2019-11-25 PROCEDURE — 84443 ASSAY THYROID STIM HORMONE: CPT | Performed by: INTERNAL MEDICINE

## 2019-12-06 PROBLEM — R06.83 SNORING: Status: ACTIVE | Noted: 2019-05-24

## 2019-12-06 PROBLEM — H52.4 BILATERAL PRESBYOPIA: Status: ACTIVE | Noted: 2019-12-06

## 2019-12-06 PROBLEM — R42 DIZZINESS: Status: ACTIVE | Noted: 2018-04-05

## 2019-12-06 PROBLEM — H16.9 KERATITIS: Status: ACTIVE | Noted: 2017-10-23

## 2019-12-06 PROBLEM — R53.83 FATIGUE: Status: ACTIVE | Noted: 2018-05-16

## 2019-12-06 PROBLEM — R22.9 SUBCUTANEOUS NODULE: Status: ACTIVE | Noted: 2017-04-24

## 2019-12-06 PROBLEM — H25.13 NUCLEAR SENILE CATARACT OF BOTH EYES: Status: ACTIVE | Noted: 2019-12-06

## 2019-12-06 PROBLEM — R59.0 INGUINAL LYMPHADENOPATHY: Status: ACTIVE | Noted: 2017-09-26

## 2019-12-06 PROBLEM — Z86.69 HISTORY OF RETINAL TEAR: Status: ACTIVE | Noted: 2019-12-06

## 2019-12-06 PROBLEM — H43.811 PVD (POSTERIOR VITREOUS DETACHMENT), RIGHT EYE: Status: ACTIVE | Noted: 2019-12-06

## 2019-12-06 PROBLEM — N40.0 BENIGN PROSTATIC HYPERPLASIA: Status: ACTIVE | Noted: 2018-04-05

## 2019-12-06 PROBLEM — H25.813 COMBINED FORMS OF AGE-RELATED CATARACT, BILATERAL: Status: ACTIVE | Noted: 2019-12-06

## 2019-12-06 PROBLEM — Z98.890 OTHER SPECIFIED POSTPROCEDURAL STATES: Status: ACTIVE | Noted: 2019-12-06

## 2019-12-06 PROBLEM — H35.89 OTHER SPECIFIED RETINAL DISORDERS: Status: ACTIVE | Noted: 2019-12-06

## 2019-12-06 PROBLEM — H43.813 VITREOUS DEGENERATION, BILATERAL: Status: ACTIVE | Noted: 2019-12-06

## 2019-12-09 ENCOUNTER — OFFICE VISIT (OUTPATIENT)
Dept: FAMILY MEDICINE CLINIC | Facility: CLINIC | Age: 64
End: 2019-12-09

## 2019-12-09 VITALS
SYSTOLIC BLOOD PRESSURE: 122 MMHG | HEIGHT: 67 IN | RESPIRATION RATE: 16 BRPM | BODY MASS INDEX: 26.71 KG/M2 | TEMPERATURE: 98.6 F | WEIGHT: 170.2 LBS | OXYGEN SATURATION: 98 % | HEART RATE: 79 BPM | DIASTOLIC BLOOD PRESSURE: 70 MMHG

## 2019-12-09 DIAGNOSIS — K21.9 GASTROESOPHAGEAL REFLUX DISEASE WITHOUT ESOPHAGITIS: ICD-10-CM

## 2019-12-09 DIAGNOSIS — Z00.00 PREVENTATIVE HEALTH CARE: Primary | ICD-10-CM

## 2019-12-09 DIAGNOSIS — L57.0 ACTINIC KERATOSES: ICD-10-CM

## 2019-12-09 DIAGNOSIS — E78.2 MIXED HYPERLIPIDEMIA: ICD-10-CM

## 2019-12-09 PROBLEM — R06.83 SNORING: Status: RESOLVED | Noted: 2019-05-24 | Resolved: 2019-12-09

## 2019-12-09 PROBLEM — R42 DIZZINESS: Status: RESOLVED | Noted: 2018-04-05 | Resolved: 2019-12-09

## 2019-12-09 PROBLEM — R59.0 INGUINAL LYMPHADENOPATHY: Status: RESOLVED | Noted: 2017-09-26 | Resolved: 2019-12-09

## 2019-12-09 PROBLEM — R53.83 FATIGUE: Status: RESOLVED | Noted: 2018-05-16 | Resolved: 2019-12-09

## 2019-12-09 PROBLEM — H35.89 OTHER SPECIFIED RETINAL DISORDERS: Status: RESOLVED | Noted: 2019-12-06 | Resolved: 2019-12-09

## 2019-12-09 PROBLEM — H16.9 KERATITIS: Status: RESOLVED | Noted: 2017-10-23 | Resolved: 2019-12-09

## 2019-12-09 PROBLEM — G47.33 OSA (OBSTRUCTIVE SLEEP APNEA): Status: ACTIVE | Noted: 2019-12-09

## 2019-12-09 PROBLEM — Z86.69 HISTORY OF RETINAL TEAR: Status: RESOLVED | Noted: 2019-12-06 | Resolved: 2019-12-09

## 2019-12-09 PROBLEM — R22.9 SUBCUTANEOUS NODULE: Status: RESOLVED | Noted: 2017-04-24 | Resolved: 2019-12-09

## 2019-12-09 PROCEDURE — 99396 PREV VISIT EST AGE 40-64: CPT | Performed by: NURSE PRACTITIONER

## 2019-12-09 RX ORDER — OMEPRAZOLE 40 MG/1
40 CAPSULE, DELAYED RELEASE ORAL DAILY
Qty: 90 CAPSULE | Refills: 3 | Status: SHIPPED | OUTPATIENT
Start: 2019-12-09 | End: 2020-06-08 | Stop reason: SDUPTHER

## 2019-12-09 RX ORDER — ASPIRIN 81 MG/1
81 TABLET, CHEWABLE ORAL DAILY
COMMUNITY
End: 2021-12-16

## 2019-12-09 RX ORDER — SILDENAFIL CITRATE 20 MG/1
TABLET ORAL
COMMUNITY
Start: 2017-04-25 | End: 2020-06-08

## 2019-12-09 RX ORDER — SIMVASTATIN 10 MG
10 TABLET ORAL NIGHTLY
Qty: 90 TABLET | Refills: 3 | Status: SHIPPED | OUTPATIENT
Start: 2019-12-09 | End: 2020-06-08 | Stop reason: SDUPTHER

## 2019-12-09 NOTE — PROGRESS NOTES
"Subjective   Sourav Proctor is a 63 y.o. male.     Chief Complaint   Patient presents with   • Annual Exam       /70 (BP Location: Right arm, Patient Position: Sitting, Cuff Size: Adult)   Pulse 79   Temp 98.6 °F (37 °C) (Oral)   Resp 16   Ht 170.2 cm (67\")   Wt 77.2 kg (170 lb 3.2 oz)   SpO2 98%   BMI 26.66 kg/m²     BP Readings from Last 3 Encounters:   12/09/19 122/70   11/13/19 117/78   09/04/19 116/77       Wt Readings from Last 3 Encounters:   12/09/19 77.2 kg (170 lb 3.2 oz)   11/13/19 77.1 kg (170 lb)   09/04/19 77.6 kg (171 lb)       Pt comes in today for routine physical.   Labs have already been done and reviewed.  He is UTD on colonoscopy  Pt see urology for prostate checks and ED issues.  Does have a couple of lesions he is concerned about. Dry patches and spots on face.        Past Surgical History:   Procedure Laterality Date   • VASECTOMY         Social History     Socioeconomic History   • Marital status:      Spouse name: Not on file   • Number of children: 2   • Years of education: Not on file   • Highest education level: Not on file   Occupational History   • Occupation:       Employer: [employer]   Tobacco Use   • Smoking status: Never Smoker   • Smokeless tobacco: Never Used   Substance and Sexual Activity   • Alcohol use: Yes     Frequency: 2-3 times a week   • Drug use: Never   • Sexual activity: Defer       The following portions of the patient's history were reviewed and updated as appropriate: allergies, current medications, past family history, past medical history, past social history, past surgical history and problem list.    Review of Systems   Constitutional: Negative for activity change, unexpected weight gain and unexpected weight loss.   Eyes: Negative for visual disturbance.   Respiratory: Negative for chest tightness and shortness of breath.    Cardiovascular: Negative for chest pain, palpitations and leg swelling.   Gastrointestinal: Negative for " abdominal pain, blood in stool, constipation, diarrhea and indigestion.   Endocrine: Negative for polydipsia and polyuria.   Genitourinary: Negative for difficulty urinating.   Skin: Negative for skin lesions.   Neurological: Negative for headache.   Psychiatric/Behavioral: Negative for agitation, sleep disturbance and stress.       Objective   Physical Exam   Constitutional: He is oriented to person, place, and time. He appears well-developed and well-nourished.   HENT:   Head: Normocephalic.   Eyes: Pupils are equal, round, and reactive to light. Conjunctivae are normal.   Neck: Normal range of motion. Neck supple. No thyromegaly present.   Cardiovascular: Normal rate and regular rhythm.   No murmur heard.  Pulmonary/Chest: Effort normal and breath sounds normal.   Abdominal: Soft. Bowel sounds are normal. There is no tenderness.   Musculoskeletal: Normal range of motion.   Neurological: He is alert and oriented to person, place, and time.   Skin: Skin is warm and dry. No lesion noted.   Mult AC on face    Psychiatric: He has a normal mood and affect. His behavior is normal.       Diagnoses and all orders for this visit:    1. Preventative health care (Primary)    2. Mixed hyperlipidemia  -     simvastatin (ZOCOR) 10 MG tablet; Take 1 tablet by mouth Every Night.  Dispense: 90 tablet; Refill: 3    3. Gastroesophageal reflux disease without esophagitis  -     omeprazole (priLOSEC) 40 MG capsule; Take 1 capsule by mouth Daily.  Dispense: 90 capsule; Refill: 3    4. Actinic keratoses  Comments:  pt is going to see derm for skin check.     reviewed labs  During this visit for their annual exam, we reviewed their personal history, social history and family history. We went over their medications and all the recommended health maintenance items for their age group. They were given the opportunity to ask questions and discuss other concerns.       Return in about 6 months (around 6/9/2020).

## 2019-12-24 RX ORDER — OSELTAMIVIR PHOSPHATE 75 MG/1
75 CAPSULE ORAL 2 TIMES DAILY
Qty: 10 CAPSULE | Refills: 0 | Status: SHIPPED | OUTPATIENT
Start: 2019-12-24 | End: 2020-06-08

## 2020-02-13 ENCOUNTER — OFFICE VISIT (OUTPATIENT)
Dept: SLEEP MEDICINE | Facility: HOSPITAL | Age: 65
End: 2020-02-13

## 2020-02-13 VITALS
WEIGHT: 172.4 LBS | OXYGEN SATURATION: 97 % | BODY MASS INDEX: 27.06 KG/M2 | DIASTOLIC BLOOD PRESSURE: 69 MMHG | SYSTOLIC BLOOD PRESSURE: 112 MMHG | HEIGHT: 67 IN | HEART RATE: 81 BPM

## 2020-02-13 DIAGNOSIS — G47.33 OBSTRUCTIVE SLEEP APNEA, ADULT: Primary | ICD-10-CM

## 2020-02-13 PROCEDURE — G0463 HOSPITAL OUTPT CLINIC VISIT: HCPCS

## 2020-02-13 NOTE — PROGRESS NOTES
SLEEP MEDICINE CONSULT      Patient Identification:     Name: Sourav Proctor   Age: 64 y.o.   Gender: male   : 1955   MRN: [id number]     Date of consult: 2020    PCP/Referring Physician(s):     PCP: Chayito Freedman MD  Referring Provider: Raina Keith MD      Chief Complaint:     Obstructive sleep apnea.  Follow-up after 3 months for increasing auto CPAP pressures .  History of Present Illness:   This is a very pleasant gentleman who underwent an unattended polysomnogram which documented presence of mild obstructive sleep apnea.  He was provided with an auto CPAP mode of therapy with a pressure range between 5-15.  During previous visits the pressure was changed to the range of 10-20 CWP  He here today for  follow-up for compliance.  Memory card has been downloaded.    He has used a full facemask which she finds somewhat uncomfortable.  He has to wear the mask somewhat snug in order to avoid air leaks.  The mask rubs against against his intranasal septum.  It leaves it sore.  He has tried nasal mask.  He found the nasal mask very uncomfortable.  He is a mouth breather.  Oral venting bothered him with the nasal mask.  Pressures are quite tolerable to him  He uses humidifier on regular basis.    He has slept much better with the mask and given pressures though his sleep is occasionally disturbed by air leaks around the mask.   His usual bedtime  is 10 PM.  Takes him less than 15 minutes to fall asleep.  He takes BENADRYL as a sleeping aid.    He tossed  And turned through out the night.  He woke up at 5 in the morning to start his day.  He denies any symptoms of headache at night or in the morning.  No heartburn at night or in the morning.  Decrease in severity of dry mouth.  Decreased symptoms of nasal congestion at night as well as in the morning.  He does not snore with the mask in place.    He has  woke up in morning awake alert and rested.  He usually does not take any naps during the  working days.  He has dozed off on the weekend with his grandchild.       Allergies, Medications, and Past History:   Allergies:    No Known Allergies  Current Medications:    Prior to Admission medications    Medication Sig Start Date End Date Taking? Authorizing Provider   omeprazole (priLOSEC) 40 MG capsule Take 40 mg by mouth Daily.   Yes Clinton Iyer MD   sildenafil (VIAGRA) 100 MG tablet Take 100 mg by mouth Daily As Needed for erectile dysfunction.   Yes Clinton Iyer MD   simvastatin (ZOCOR) 10 MG tablet Take 10 mg by mouth Every Night.   Yes ProviderClinton MD     Past Medical History:    Past Medical History:   Diagnosis Date   • Anemia, iron deficiency    • Benign prostatic hyperplasia 4/5/2018   • Bilateral presbyopia 12/6/2019   • Combined forms of age-related cataract, bilateral 12/6/2019   • DJD (degenerative joint disease) of cervical spine    • Gastroesophageal reflux disease 10/24/2016   • GERD (gastroesophageal reflux disease)    • Hypercholesterolemia    • Hyperlipidemia 4/25/2016   • Inguinal lymphadenopathy 9/26/2017   • Keratitis 10/23/2017   • Male erectile disorder (CODE) 4/25/2016   • Nuclear senile cataract of both eyes 12/6/2019   • Other specified retinal disorders 12/6/2019   • PVD (posterior vitreous detachment), right eye 12/6/2019   • Sleep apnea, obstructive    • Subcutaneous nodule 4/24/2017   • Vitreous degeneration, bilateral 12/6/2019      Past Surgical History:    Past Surgical History:   Procedure Laterality Date   • VASECTOMY        Social History:    Social History     Tobacco Use   • Smoking status: Never Smoker   • Smokeless tobacco: Never Used   Substance Use Topics   • Alcohol use: Yes     Frequency: 2-3 times a week   • Drug use: Never     Family Medical History:  Family History   Problem Relation Age of Onset   • No Known Problems Mother    • Coronary artery disease Father    • Hypertension Father    • No Known Problems Sister    • No Known  "Problems Brother    • Stroke Maternal Grandfather    • Throat cancer Paternal Grandmother    • Heart disease Paternal Grandfather    • No Known Problems Sister    • No Known Problems Brother    • No Known Problems Brother          Review of Systems:   Constitutional:  Denies fever or chills and weight gain  Eyes:  Denies change in visual acuity   HENT:  Denies nasal congestion, sore throat or post nasal drainage   Respiratory:  Denies cough, shortness of breath or dyspnea on exertion    Cardiovascular:  Denies chest pain or edema   GI:  Denies abdominal pain, nausea, vomiting, bloody stools or diarrhea .  No aerophagia.  :  Denies dysuria or nocturia   Musculoskeletal:  Denies back pain or joint pain   Integument:  Denies rash   Neurologic:  Denies headache, focal weakness or sensory changes. No history of stroke or seizures.   Endocrine:  Denies polyuria or polydipsia   Lymphatic:  Denies swollen glands   Psychiatric:  Denies depression, anxiety or claustrophobia      Physical Exam:     Vitals:    02/13/20 1525   BP: 112/69   Pulse: 81   SpO2: 97%   Weight: 78.2 kg (172 lb 6.4 oz)   Height: 170.2 cm (67\")     HEENT: Head is normocephalic, atraumatic, normal distribution of hair. Pupils reactive to light. Ocular movements intact. mild nasal congestion on sniff test. No deviated nasal septum. No micrognathia.  Throat: Mallapatti stage 4, tongue midline, mucosa moist.  Neck: no JVD, thyromegaly, mass, +midline trachea, Neck circumference 15 inches  Lungs: clear, no wheeze, rhonchi, crackles  Cardiovascular: S1, S2, RRR no murmurs, rubs or gallops  Abd: +BS's soft NT/ND, no CVA tenderness.    Ext: no edema, cyanosis, clubbing, pulses intact  Neuro: Awake alert, oriented to time place and person. Grossly intact to motor, sensory cerebral, and cerebellar (without focal deficit)  Psych: No overt rolly, depression, psychosis or inappropriate behavior  Skin: No rash, cellulitis, or ulcerations.  No facial rash or " erosions    Diagnostic data;  Memory card download shows data from 11/13/2019 to 2/12/2020.  Overall 92 days of data reviewed.  Percentage of use is 97.8%.  Maximum hours use 8 hours 59 minutes . minimum time used 3 hour15 minutes.  94.6% of the time the mask has been used for more than 4 hours.  The average apnea hypopnea index is 5.3 events per hour on an auto CPAP mode of therapy with a pressure range between 8-20 CWP.    Assessment/Plan:   Obstructive sleep apnea:  This is a very pleasant gentleman who underwent an unattended polysomnogram which showed presence of mild obstructive sleep apnea.  He was prescribed an auto CPAP mode of therapy.  The pressure range is presently between 8-20 CWP.    He has remained compliant with therapy.  He is getting benefit from it.    The results of memory card download was discussed in detail with the patient.  All questions were answered.  He has difficulty tolerating the mask because of the air leaks around the mask      A small tutorial was given to him about the use of his full facemask.  Recommendation .  To increase the pressures of an auto CPAP mode of therapy to the  range between 12-20 CWP.  He is advised to continue using his full facemask.  He is advised to use the mask with given pressures a nightly basis.  He is advised to use the mask with given pressures for at least 4 hours a minimum benefit or as long as he sleeps for maximum benefit.  He is advised to use the mask with given pressures if he takes a nap during the daytime.  2.  Allergic rhinitis.  He has remained symptomatic.  His upper airway congestion is interfering with adequate therapy for obstructive sleep apnea.  Recommendation.  Aggressive therapy is recommended.   Advised to use a steroid nasal spray which he can get over-the-counter.  1 spray to each nostril at night.  He will benefit from allergy testing.    3.  Sleep onset and sleep maintenance insomnia most likely related to underlying  anxiety/depression.  Recommendation.  Aggressive therapy for underlying factors is recommended in order to control the symptoms of insomnia.    Driving instructions. Patient is advised to avoid driving if tired or somnolent. To avoid all alcoholic beverages or medications causing somnolence.         Diagnosis Plan   1. Obstructive sleep apnea, adult       No orders of the defined types were placed in this encounter.    No orders of the defined types were placed in this encounter.        Raina Keith MD  2/13/2020  4:38 PM

## 2020-02-20 ENCOUNTER — TELEPHONE (OUTPATIENT)
Dept: SLEEP MEDICINE | Facility: HOSPITAL | Age: 65
End: 2020-02-20

## 2020-02-20 NOTE — TELEPHONE ENCOUNTER
"Patient is unable to sleep since pressure change. He feels as though the pressure is \"about to blow my face off\". He would like to go back to his old pressures if possible, or at least lower than the current ones. He also tried loosening the straps on his mask as you recommended, but didn't like it so he tightened it back up and is fine with that.  "

## 2020-03-09 ENCOUNTER — TELEPHONE (OUTPATIENT)
Dept: SLEEP MEDICINE | Facility: HOSPITAL | Age: 65
End: 2020-03-09

## 2020-03-09 NOTE — TELEPHONE ENCOUNTER
Patient experiencing dry mouth, feels as though mask is leaking, and is not sleeping as well as he was prior to last visit when pressures were changed. He changed humidifier from 3 to 4, feels as though it helped a little, but not fully. Report is printed for your review and I will advise patient based on your recommendation.

## 2020-03-16 ENCOUNTER — OFFICE VISIT (OUTPATIENT)
Dept: SLEEP MEDICINE | Facility: HOSPITAL | Age: 65
End: 2020-03-16

## 2020-03-16 VITALS
SYSTOLIC BLOOD PRESSURE: 115 MMHG | DIASTOLIC BLOOD PRESSURE: 75 MMHG | WEIGHT: 169 LBS | HEART RATE: 71 BPM | BODY MASS INDEX: 26.53 KG/M2 | OXYGEN SATURATION: 100 % | HEIGHT: 67 IN

## 2020-03-16 DIAGNOSIS — G47.33 OBSTRUCTIVE SLEEP APNEA, ADULT: Primary | ICD-10-CM

## 2020-03-16 PROCEDURE — G0463 HOSPITAL OUTPT CLINIC VISIT: HCPCS

## 2020-03-16 NOTE — PROGRESS NOTES
SLEEP MEDICINE CONSULT      Patient Identification:     Name: Sourav Proctor   Age: 64 y.o.   Gender: male   : 1955   MRN: [id number]     Date of consult: 3/16/2020    PCP/Referring Physician(s):     PCP: Chayito Freedman MD  Referring Provider: Raina Keith MD      Chief Complaint:     Obstructive sleep apnea.  Follow-up after   increasing auto CPAP pressures to the range of 12 to 20 cwp.  History of Present Illness:   This is a very pleasant gentleman who underwent an unattended polysomnogram which documented presence of mild obstructive sleep apnea.  He was provided with an auto CPAP mode of therapy with a pressure range between 5-15.  During one of the previous visits the pressure was changed to the range of 10-20 CWP. During last visit the auto-CPAP pressure was increased to 12 to 20 cwp.  He here today for  follow-up for compliance.  Memory card has been downloaded.    He has used a full facemask DreamWear mask.  The size is small with the headgear of medium.  He finds the mask too large for his liking.  Air leaks bothersome.  He would like to try an extra small size of the same mask.  Pressures are too high for his comfort.   He uses humidifier on regular basis.    He has slept poorly with the mask and given pressures.   His sleep  is occasionally disturbed by air leaks around the mask.   His usual bedtime  is 10 PM.  Takes him less than 15 minutes to fall asleep.  He takes BENADRYL as a sleeping aid.    He  has tossed  And turned through out the night.  He usually wakes up  at 5 in the morning to start his day.  He denies any symptoms of headache at night or in the morning.  No heartburn at night or in the morning.  Decrease in severity of dry mouth with increasing humidification..  Decreased symptoms of nasal congestion at night as well as in the morning.  He does not snore with the mask in place.    He has  woke up in morning awake alert and rested.  He usually does not take any naps  during the working days.  He has dozed off on the weekend with his grandchild.       Allergies, Medications, and Past History:   Allergies:    No Known Allergies  Current Medications:    Prior to Admission medications    Medication Sig Start Date End Date Taking? Authorizing Provider   omeprazole (priLOSEC) 40 MG capsule Take 40 mg by mouth Daily.   Yes Clinton Iyer MD   sildenafil (VIAGRA) 100 MG tablet Take 100 mg by mouth Daily As Needed for erectile dysfunction.   Yes Clinton Iyer MD   simvastatin (ZOCOR) 10 MG tablet Take 10 mg by mouth Every Night.   Yes ProviderClinton MD     Past Medical History:    Past Medical History:   Diagnosis Date   • Anemia, iron deficiency    • Benign prostatic hyperplasia 4/5/2018   • Bilateral presbyopia 12/6/2019   • Combined forms of age-related cataract, bilateral 12/6/2019   • DJD (degenerative joint disease) of cervical spine    • Gastroesophageal reflux disease 10/24/2016   • GERD (gastroesophageal reflux disease)    • Hypercholesterolemia    • Hyperlipidemia 4/25/2016   • Inguinal lymphadenopathy 9/26/2017   • Keratitis 10/23/2017   • Male erectile disorder (CODE) 4/25/2016   • Nuclear senile cataract of both eyes 12/6/2019   • Other specified retinal disorders 12/6/2019   • PVD (posterior vitreous detachment), right eye 12/6/2019   • Sleep apnea, obstructive    • Subcutaneous nodule 4/24/2017   • Vitreous degeneration, bilateral 12/6/2019      Past Surgical History:    Past Surgical History:   Procedure Laterality Date   • VASECTOMY        Social History:    Social History     Tobacco Use   • Smoking status: Never Smoker   • Smokeless tobacco: Never Used   Substance Use Topics   • Alcohol use: Yes     Frequency: 2-3 times a week   • Drug use: Never     Family Medical History:  Family History   Problem Relation Age of Onset   • No Known Problems Mother    • Coronary artery disease Father    • Hypertension Father    • No Known Problems Sister    • No  "Known Problems Brother    • Stroke Maternal Grandfather    • Throat cancer Paternal Grandmother    • Heart disease Paternal Grandfather    • No Known Problems Sister    • No Known Problems Brother    • No Known Problems Brother          Review of Systems:   Constitutional:  Denies fever or chills and weight gain  Eyes:  Denies change in visual acuity   HENT:  Denies nasal congestion, sore throat or post nasal drainage   Respiratory:  Denies cough, shortness of breath or dyspnea on exertion    Cardiovascular:  Denies chest pain or edema   GI:  Denies abdominal pain, nausea, vomiting, bloody stools or diarrhea .  No aerophagia.  :  Denies dysuria or nocturia   Musculoskeletal:  Denies back pain or joint pain   Integument:  Denies rash   Neurologic:  Denies headache, focal weakness or sensory changes. No history of stroke or seizures.   Endocrine:  Denies polyuria or polydipsia   Lymphatic:  Denies swollen glands   Psychiatric:  Denies depression, anxiety or claustrophobia      Physical Exam:     Vitals:    03/16/20 1520   BP: 115/75   Pulse: 71   SpO2: 100%   Weight: 76.7 kg (169 lb)   Height: 170.2 cm (67\")     HEENT: Head is normocephalic, atraumatic, normal distribution of hair. Pupils reactive to light. Ocular movements intact. mild nasal congestion on sniff test. No deviated nasal septum. No micrognathia.  Throat: Mallapatti stage 4, tongue midline, mucosa moist.  Neck: no JVD, thyromegaly, mass, +midline trachea, Neck circumference 15 inches  Lungs: clear, no wheeze, rhonchi, crackles  Cardiovascular: S1, S2, RRR no murmurs, rubs or gallops  Abd: +BS's soft NT/ND, no CVA tenderness.    Ext: no edema, cyanosis, clubbing, pulses intact  Neuro: Awake alert, oriented to time place and person. Grossly intact to motor, sensory cerebral, and cerebellar (without focal deficit)  Psych: No overt rolly, depression, psychosis or inappropriate behavior  Skin: No rash, cellulitis, or ulcerations.  No facial rash or " erosions    Diagnostic data;  Memory card download shows data from 2/13/2020 to 3/15/2020.  Overall 32 days of data reviewed.  Percentage of use is 90.6%.  Maximum hours use 7 hours 41 minutes . minimum time used 2 hour45 minutes.  84.4% of the time the mask has been used for more than 4 hours.  The average apnea hypopnea index is 4.1 events per hour on an auto CPAP mode of therapy with a pressure range between 12-20 CWP.    Assessment/Plan:   Obstructive sleep apnea:  This is a very pleasant gentleman who underwent an unattended polysomnogram which showed presence of mild obstructive sleep apnea.  He was prescribed an auto CPAP mode of therapy.  The pressure range is presently between 12-20 CWP.    He has remained compliant with therapy.  He is getting benefit from it.    The results of memory card download was discussed in detail with the patient.  All questions were answered.  He has difficulty tolerating the mask because of the air leaks around the mask    Recommendation .  To decrease  the pressures of an auto CPAP mode of therapy to the  range between 8-20 CWP for patients comfort.  He is advised to continue using his full facemask.  He is advised to use the mask with given pressures a nightly basis.  He is advised to use the mask with given pressures for at least 4 hours a minimum benefit or as long as he sleeps for maximum benefit.  He is advised to use the mask with given pressures if he takes a nap during the daytime.  An extra small size of DreamWear full facemask.  2.  Allergic rhinitis.  He has remained symptomatic.  His upper airway congestion is interfering with adequate therapy for obstructive sleep apnea.  Recommendation.  Aggressive therapy is recommended.   Advised to use a steroid nasal spray which he can get over-the-counter.  1 spray to each nostril at night.  He will benefit from allergy testing.    3.  Sleep onset and sleep maintenance insomnia most likely related to underlying  anxiety/depression.  Recommendation.  Aggressive therapy for underlying factors is recommended in order to control the symptoms of insomnia.    Driving instructions. Patient is advised to avoid driving if tired or somnolent. To avoid all alcoholic beverages or medications causing somnolence.         Diagnosis Plan   1. Obstructive sleep apnea, adult  CPAP Therapy     No orders of the defined types were placed in this encounter.    Orders Placed This Encounter   Procedures   • CPAP Therapy     dreamware full face mask.  Please find a extra small size.     Order Specific Question:   Equipment:     Answer:    Auto-CPAP     Order Specific Question:   Min Pressure (cmH2O):     Answer:   8     Order Specific Question:   Max Pressure (cmH2O):     Answer:   20     Order Specific Question:   Ramp:     Answer:   Yes     Order Specific Question:   Minutes:     Answer:   20     Order Specific Question:   Pressure (cmH20):     Answer:   4     Order Specific Question:   Flex/EPR:     Answer:   No     Order Specific Question:   PAP Machine Brand     Answer:   Respironics     Order Specific Question:   PAP Tubing (1 per 3 months)     Answer:    6' Tubing with integrated heating element     Order Specific Question:   PAP Mask (1 per 3 months)     Answer:    Full face mask     Order Specific Question:   Mask interface (1 per month)     Answer:    Face Mask Interface     Order Specific Question:   PAP Accessories     Answer:    Water Chamber for PAP Humidifier (1 per 6 month) &  Filter PAP Disposable (2 per month) &  Filter PAP Non-Disposable (1 per 6 months) &  Chinstrap to be used with PAP (1 per 6 months) &  Headgear to be used with PAP (1 per 6 mo)     Order Specific Question:   The prescribed settings for the PAP ordered are     Answer:   please change auto-cpap pressures to 8 to 20 cwp.     Order Specific Question:   Does the patient have Obstructive Sleep Apnea or other qualifying  diagnosis for PAP ordered?     Answer:   Yes     Order Specific Question:   Does the patient require humidification?     Answer:   Yes     Order Specific Question:   Humidifier     Answer:    Humidifier Heated for CPAP or BiPAP     Order Specific Question:   If ordering a BiPAP for TREY a CPAP has been tried and/or ruled out?     Answer:   Does not apply     Order Specific Question:   The patient requires a PAP Device & continued use of Supplies to administer effective PAP therapy at the frequency of use ordered above     Answer:   Yes     Order Specific Question:   Initial Supplies and refills authorized for 12 months?     Answer:   Yes     Order Specific Question:   The face to face evaluation was performed on     Answer:   3/16/2020     Order Specific Question:   Which [employer] company is this being sent to?     [id number]     Order Specific Question:   Length of Need (99 Months = Lifetime)     Answer:   99 Months = Lifetime         Raina Keith MD  3/16/2020  16:12

## 2020-05-20 ENCOUNTER — TELEPHONE (OUTPATIENT)
Dept: FAMILY MEDICINE CLINIC | Facility: CLINIC | Age: 65
End: 2020-05-20

## 2020-05-20 DIAGNOSIS — G47.33 OSA (OBSTRUCTIVE SLEEP APNEA): ICD-10-CM

## 2020-05-20 DIAGNOSIS — K21.9 GASTROESOPHAGEAL REFLUX DISEASE, ESOPHAGITIS PRESENCE NOT SPECIFIED: ICD-10-CM

## 2020-05-20 DIAGNOSIS — Z12.5 SCREENING FOR PROSTATE CANCER: ICD-10-CM

## 2020-05-20 DIAGNOSIS — E78.2 MIXED HYPERLIPIDEMIA: Primary | ICD-10-CM

## 2020-05-20 NOTE — TELEPHONE ENCOUNTER
Pt is coming in for fasting labs on 5/28/20 10:00 am for his 6/8/20 appointment. Please put lab orders in. Thank you.

## 2020-05-28 ENCOUNTER — LAB (OUTPATIENT)
Dept: FAMILY MEDICINE CLINIC | Facility: CLINIC | Age: 65
End: 2020-05-28

## 2020-05-28 DIAGNOSIS — Z12.5 SCREENING FOR PROSTATE CANCER: ICD-10-CM

## 2020-05-28 DIAGNOSIS — K21.9 GASTROESOPHAGEAL REFLUX DISEASE, ESOPHAGITIS PRESENCE NOT SPECIFIED: ICD-10-CM

## 2020-05-28 DIAGNOSIS — G47.33 OSA (OBSTRUCTIVE SLEEP APNEA): ICD-10-CM

## 2020-05-28 DIAGNOSIS — E78.2 MIXED HYPERLIPIDEMIA: ICD-10-CM

## 2020-05-28 LAB
ALBUMIN SERPL-MCNC: 4.3 G/DL (ref 3.5–5.2)
ALBUMIN/GLOB SERPL: 2 G/DL
ALP SERPL-CCNC: 52 U/L (ref 39–117)
ALT SERPL W P-5'-P-CCNC: 17 U/L (ref 1–41)
ANION GAP SERPL CALCULATED.3IONS-SCNC: 7.4 MMOL/L (ref 5–15)
AST SERPL-CCNC: 16 U/L (ref 1–40)
BASOPHILS # BLD AUTO: 0.02 10*3/MM3 (ref 0–0.2)
BASOPHILS NFR BLD AUTO: 0.4 % (ref 0–1.5)
BILIRUB SERPL-MCNC: 0.4 MG/DL (ref 0.2–1.2)
BUN BLD-MCNC: 19 MG/DL (ref 8–23)
BUN/CREAT SERPL: 18.3 (ref 7–25)
CALCIUM SPEC-SCNC: 9.3 MG/DL (ref 8.6–10.5)
CHLORIDE SERPL-SCNC: 104 MMOL/L (ref 98–107)
CHOLEST SERPL-MCNC: 152 MG/DL (ref 0–200)
CO2 SERPL-SCNC: 28.6 MMOL/L (ref 22–29)
CREAT BLD-MCNC: 1.04 MG/DL (ref 0.76–1.27)
DEPRECATED RDW RBC AUTO: 41.8 FL (ref 37–54)
EOSINOPHIL # BLD AUTO: 0.07 10*3/MM3 (ref 0–0.4)
EOSINOPHIL NFR BLD AUTO: 1.3 % (ref 0.3–6.2)
ERYTHROCYTE [DISTWIDTH] IN BLOOD BY AUTOMATED COUNT: 12.4 % (ref 12.3–15.4)
GFR SERPL CREATININE-BSD FRML MDRD: 72 ML/MIN/1.73
GLOBULIN UR ELPH-MCNC: 2.2 GM/DL
GLUCOSE BLD-MCNC: 104 MG/DL (ref 65–99)
HCT VFR BLD AUTO: 39.9 % (ref 37.5–51)
HDLC SERPL-MCNC: 44 MG/DL (ref 40–60)
HGB BLD-MCNC: 13.8 G/DL (ref 13–17.7)
IMM GRANULOCYTES # BLD AUTO: 0.01 10*3/MM3 (ref 0–0.05)
IMM GRANULOCYTES NFR BLD AUTO: 0.2 % (ref 0–0.5)
LDLC SERPL CALC-MCNC: 92 MG/DL (ref 0–100)
LDLC/HDLC SERPL: 2.09 {RATIO}
LYMPHOCYTES # BLD AUTO: 1.45 10*3/MM3 (ref 0.7–3.1)
LYMPHOCYTES NFR BLD AUTO: 26.8 % (ref 19.6–45.3)
MCH RBC QN AUTO: 31.8 PG (ref 26.6–33)
MCHC RBC AUTO-ENTMCNC: 34.6 G/DL (ref 31.5–35.7)
MCV RBC AUTO: 91.9 FL (ref 79–97)
MONOCYTES # BLD AUTO: 0.48 10*3/MM3 (ref 0.1–0.9)
MONOCYTES NFR BLD AUTO: 8.9 % (ref 5–12)
NEUTROPHILS # BLD AUTO: 3.38 10*3/MM3 (ref 1.7–7)
NEUTROPHILS NFR BLD AUTO: 62.4 % (ref 42.7–76)
NRBC BLD AUTO-RTO: 0 /100 WBC (ref 0–0.2)
PLATELET # BLD AUTO: 242 10*3/MM3 (ref 140–450)
PMV BLD AUTO: 10.5 FL (ref 6–12)
POTASSIUM BLD-SCNC: 4.3 MMOL/L (ref 3.5–5.2)
PROT SERPL-MCNC: 6.5 G/DL (ref 6–8.5)
PSA SERPL-MCNC: 0.81 NG/ML (ref 0–4)
RBC # BLD AUTO: 4.34 10*6/MM3 (ref 4.14–5.8)
SODIUM BLD-SCNC: 140 MMOL/L (ref 136–145)
TRIGL SERPL-MCNC: 80 MG/DL (ref 0–150)
TSH SERPL DL<=0.05 MIU/L-ACNC: 1.34 UIU/ML (ref 0.27–4.2)
VIT B12 BLD-MCNC: 392 PG/ML (ref 211–946)
VLDLC SERPL-MCNC: 16 MG/DL (ref 5–40)
WBC NRBC COR # BLD: 5.41 10*3/MM3 (ref 3.4–10.8)

## 2020-05-28 PROCEDURE — 80061 LIPID PANEL: CPT | Performed by: INTERNAL MEDICINE

## 2020-05-28 PROCEDURE — 82607 VITAMIN B-12: CPT | Performed by: INTERNAL MEDICINE

## 2020-05-28 PROCEDURE — 36415 COLL VENOUS BLD VENIPUNCTURE: CPT

## 2020-05-28 PROCEDURE — 85025 COMPLETE CBC W/AUTO DIFF WBC: CPT | Performed by: INTERNAL MEDICINE

## 2020-05-28 PROCEDURE — G0103 PSA SCREENING: HCPCS | Performed by: INTERNAL MEDICINE

## 2020-05-28 PROCEDURE — 84443 ASSAY THYROID STIM HORMONE: CPT | Performed by: INTERNAL MEDICINE

## 2020-05-28 PROCEDURE — 80053 COMPREHEN METABOLIC PANEL: CPT | Performed by: INTERNAL MEDICINE

## 2020-06-08 ENCOUNTER — OFFICE VISIT (OUTPATIENT)
Dept: FAMILY MEDICINE CLINIC | Facility: CLINIC | Age: 65
End: 2020-06-08

## 2020-06-08 VITALS
HEIGHT: 67 IN | HEART RATE: 73 BPM | RESPIRATION RATE: 16 BRPM | TEMPERATURE: 99.1 F | SYSTOLIC BLOOD PRESSURE: 126 MMHG | DIASTOLIC BLOOD PRESSURE: 60 MMHG | WEIGHT: 169.8 LBS | BODY MASS INDEX: 26.65 KG/M2

## 2020-06-08 DIAGNOSIS — E78.2 MIXED HYPERLIPIDEMIA: ICD-10-CM

## 2020-06-08 DIAGNOSIS — K21.9 GASTROESOPHAGEAL REFLUX DISEASE WITHOUT ESOPHAGITIS: ICD-10-CM

## 2020-06-08 DIAGNOSIS — I10 ESSENTIAL HYPERTENSION: Primary | ICD-10-CM

## 2020-06-08 PROCEDURE — 99213 OFFICE O/P EST LOW 20 MIN: CPT | Performed by: INTERNAL MEDICINE

## 2020-06-08 RX ORDER — OMEPRAZOLE 40 MG/1
40 CAPSULE, DELAYED RELEASE ORAL DAILY
Qty: 90 CAPSULE | Refills: 3 | Status: SHIPPED | OUTPATIENT
Start: 2020-06-08 | End: 2021-03-30 | Stop reason: SDUPTHER

## 2020-06-08 RX ORDER — SIMVASTATIN 10 MG
10 TABLET ORAL NIGHTLY
Qty: 90 TABLET | Refills: 3 | Status: SHIPPED | OUTPATIENT
Start: 2020-06-08 | End: 2021-01-04 | Stop reason: SDUPTHER

## 2020-07-02 ENCOUNTER — OFFICE VISIT (OUTPATIENT)
Dept: FAMILY MEDICINE CLINIC | Facility: CLINIC | Age: 65
End: 2020-07-02

## 2020-07-02 VITALS
OXYGEN SATURATION: 99 % | WEIGHT: 170.4 LBS | DIASTOLIC BLOOD PRESSURE: 76 MMHG | BODY MASS INDEX: 26.69 KG/M2 | RESPIRATION RATE: 18 BRPM | SYSTOLIC BLOOD PRESSURE: 124 MMHG | TEMPERATURE: 97.8 F | HEART RATE: 57 BPM

## 2020-07-02 DIAGNOSIS — M10.9 ACUTE GOUT OF LEFT FOOT, UNSPECIFIED CAUSE: Primary | ICD-10-CM

## 2020-07-02 PROCEDURE — 99213 OFFICE O/P EST LOW 20 MIN: CPT | Performed by: PHYSICIAN ASSISTANT

## 2020-07-02 NOTE — PROGRESS NOTES
Subjective   Sourav Proctor is a 64 y.o. male.     Chief Complaint   Patient presents with   • Toe Injury       /76 (BP Location: Left arm, Patient Position: Sitting, Cuff Size: Adult)   Pulse 57   Temp 97.8 °F (36.6 °C) (Temporal)   Resp 18   Wt 77.3 kg (170 lb 6.4 oz)   SpO2 99%   BMI 26.69 kg/m²     BP Readings from Last 3 Encounters:   07/02/20 124/76   06/08/20 126/60   03/16/20 115/75       Wt Readings from Last 3 Encounters:   07/02/20 77.3 kg (170 lb 6.4 oz)   06/08/20 77 kg (169 lb 12.8 oz)   03/16/20 76.7 kg (169 lb)       HPI patient presents to the clinic for complaints of left great toe pain.  Patient states that the left great toe pain has been present over the past 3 days and is actually almost completely resolved today.  He states that the pain was a sharp pain and was not associated with any injury.  He states that the area was slightly warm but he denies any redness to the area.  He did take Tylenol with some relief.  He does try to avoid anti-inflammatory drugs due to having GERD.  He denies any history of peptic ulcer disease.    The following portions of the patient's history were reviewed and updated as appropriate: allergies, current medications, past family history, past medical history, past social history, past surgical history and problem list.    Review of Systems   Constitutional: Negative for activity change, appetite change and fatigue.   Eyes: Negative for blurred vision.   Gastrointestinal: Positive for GERD. Negative for nausea.   Endocrine: Negative for polydipsia and polyuria.   Genitourinary: Negative for dysuria and urgency.   Musculoskeletal: Positive for arthralgias. Negative for back pain.        Left great toe pain with mild warmth   Skin: Negative for rash and bruise.       Objective   Physical Exam   Constitutional: He is oriented to person, place, and time. He appears well-developed and well-nourished.   HENT:   Head: Normocephalic and atraumatic.   Eyes:  Pupils are equal, round, and reactive to light. EOM are normal.   Cardiovascular: Normal rate.   Pulmonary/Chest: Effort normal and breath sounds normal.   Musculoskeletal: He exhibits tenderness. He exhibits no edema or deformity.   No erythema to the left great toe.  Only slightly tender to palpation.   Neurological: He is alert and oriented to person, place, and time.   Skin: Skin is warm. No rash noted. No erythema.   Psychiatric: He has a normal mood and affect. His behavior is normal.         Diagnoses and all orders for this visit:    1. Acute gout of left foot, unspecified cause (Primary)  -Gout flare has almost completely resolved at this time.  We discussed that he may take over-the-counter naproxen for symptoms.  We discussed purines and a handout was given to help avoid future flareups.        Return if symptoms worsen or fail to improve.  Answers for HPI/ROS submitted by the patient on 7/1/2020   What is the primary reason for your visit?: Other  Please describe your symptoms.: Pain in left foot big toe knuckle.  Have you had these symptoms before?: Yes  How long have you been having these symptoms?: 1-4 days  Please describe any probable cause for these symptoms. : I have had this before but it would go away.  This has been lasted 3 days.  It starts up after being on my feet at work and gets better in the evening.

## 2020-07-02 NOTE — PROGRESS NOTES
Ear Cerumen Removal  Date/Time: 7/2/2020 8:19 AM  Performed by: Celestine Payne PA-C  Authorized by: Celestine Payne PA-C

## 2020-08-07 ENCOUNTER — TELEPHONE (OUTPATIENT)
Dept: FAMILY MEDICINE CLINIC | Facility: CLINIC | Age: 65
End: 2020-08-07

## 2020-08-07 PROCEDURE — U0003 INFECTIOUS AGENT DETECTION BY NUCLEIC ACID (DNA OR RNA); SEVERE ACUTE RESPIRATORY SYNDROME CORONAVIRUS 2 (SARS-COV-2) (CORONAVIRUS DISEASE [COVID-19]), AMPLIFIED PROBE TECHNIQUE, MAKING USE OF HIGH THROUGHPUT TECHNOLOGIES AS DESCRIBED BY CMS-2020-01-R: HCPCS | Performed by: FAMILY MEDICINE

## 2020-08-07 NOTE — TELEPHONE ENCOUNTER
Patient is calling to state he was exposed to someone who tested positive for Covid.  He is wanting to know if he can get an order to be tested.    Please advise.    Patient call back [phone]

## 2020-08-07 NOTE — TELEPHONE ENCOUNTER
To get tested, can go to [facility] or contact health department.  There is a chance of a false negative, so regardless of her result, should have minimal contact with others and wear a mask for 10-14 days from exposure.

## 2020-08-11 ENCOUNTER — TELEPHONE (OUTPATIENT)
Dept: URGENT CARE | Facility: CLINIC | Age: 65
End: 2020-08-11

## 2020-08-11 NOTE — TELEPHONE ENCOUNTER
----- Message from Miguelito Skelton MD sent at 8/11/2020  7:21 PM EDT -----      ----- Message -----  From: Lab, Background User  Sent: 8/11/2020   7:08 PM EDT  To: [facility] Pt Covid Results

## 2020-08-13 ENCOUNTER — OFFICE VISIT (OUTPATIENT)
Dept: SLEEP MEDICINE | Facility: HOSPITAL | Age: 65
End: 2020-08-13

## 2020-08-13 VITALS
DIASTOLIC BLOOD PRESSURE: 78 MMHG | SYSTOLIC BLOOD PRESSURE: 116 MMHG | HEIGHT: 67 IN | WEIGHT: 170 LBS | OXYGEN SATURATION: 98 % | HEART RATE: 75 BPM | BODY MASS INDEX: 26.68 KG/M2

## 2020-08-13 DIAGNOSIS — G47.33 OBSTRUCTIVE SLEEP APNEA, ADULT: Primary | ICD-10-CM

## 2020-08-13 PROCEDURE — G0463 HOSPITAL OUTPT CLINIC VISIT: HCPCS

## 2020-08-13 NOTE — PROGRESS NOTES
SLEEP MEDICINE CONSULT      Patient Identification:     Name: Sourav Proctor   Age: 64 y.o.   Gender: male   : 1955   MRN: [id number]     Date of consult: 2020    PCP/Referring Physician(s):     PCP: Chayito Freedman MD  Referring Provider: Raina Keith MD      Chief Complaint:     Obstructive sleep apnea.  6 months follow-up after decrease of  auto CPAP pressures to the range of 8 to 20 cwp.  History of Present Illness:   This is a very pleasant gentleman who underwent an unattended polysomnogram which documented presence of mild obstructive sleep apnea.  He was provided with an auto CPAP mode of therapy with a pressure range between 5-15.  During one of the previous visits the pressure was changed to the range of 10-20 CWP. During last visit the auto-CPAP pressure was decreased to  The range of 8 to 20 cwp.  He here today for  follow-up for compliance.  Memory card has been downloaded.    He has used a full facemask DreamWear mask.  He finds the mask quite uncomfortable. The head gear is quite cumbersome.The  Nasal portion of the mask does not fit well. He has often felt suffocated.  The auto- CPAP pressures are quite comfortable.  He uses humidifier on regular basis.    His usual bedtime  is 10 PM.  Takes him less than 15 minutes to fall asleep.  He takes BENADRYL as a sleeping aid.    He usually wakes up  at 5 in the morning to start his day.  He denies any symptoms of headache at night or in the morning.  No heartburn at night or in the morning.  Decrease in severity of dry mouth with increasing humidification..  Decreased symptoms of nasal congestion at night as well as in the morning.  He does not snore with the mask in place.    He has  woke up in morning awake alert and rested.  He usually does not take any naps during the working days.  He has dozed off on the weekend with his grandchild.       Allergies, Medications, and Past History:   Allergies:    No Known Allergies  Current  Medications:    Prior to Admission medications    Medication Sig Start Date End Date Taking? Authorizing Provider   omeprazole (priLOSEC) 40 MG capsule Take 40 mg by mouth Daily.   Yes Provider, MD Clinton   sildenafil (VIAGRA) 100 MG tablet Take 100 mg by mouth Daily As Needed for erectile dysfunction.   Yes Provider, MD Clinton   simvastatin (ZOCOR) 10 MG tablet Take 10 mg by mouth Every Night.   Yes Provider, MD Clinton     Past Medical History:    Past Medical History:   Diagnosis Date   • Anemia, iron deficiency    • Benign prostatic hyperplasia 4/5/2018   • Bilateral presbyopia 12/6/2019   • Combined forms of age-related cataract, bilateral 12/6/2019   • DJD (degenerative joint disease) of cervical spine    • Gastroesophageal reflux disease 10/24/2016   • GERD (gastroesophageal reflux disease)    • Hypercholesterolemia    • Hyperlipidemia 4/25/2016   • Inguinal lymphadenopathy 9/26/2017   • Keratitis 10/23/2017   • Male erectile disorder (CODE) 4/25/2016   • Nuclear senile cataract of both eyes 12/6/2019   • Other specified retinal disorders 12/6/2019   • PVD (posterior vitreous detachment), right eye 12/6/2019   • Sleep apnea, obstructive    • Subcutaneous nodule 4/24/2017   • Vitreous degeneration, bilateral 12/6/2019      Past Surgical History:    Past Surgical History:   Procedure Laterality Date   • VASECTOMY        Social History:    Social History     Tobacco Use   • Smoking status: Never Smoker   • Smokeless tobacco: Never Used   Substance Use Topics   • Alcohol use: Yes     Frequency: 2-3 times a week   • Drug use: Never     Family Medical History:  Family History   Problem Relation Age of Onset   • No Known Problems Mother    • Coronary artery disease Father    • Hypertension Father    • No Known Problems Sister    • No Known Problems Brother    • Stroke Maternal Grandfather    • Throat cancer Paternal Grandmother    • Heart disease Paternal Grandfather    • No Known Problems Sister    •  "No Known Problems Brother    • No Known Problems Brother          Review of Systems:   Constitutional:  Denies fever or chills and weight gain  Eyes:  Denies change in visual acuity   HENT:   he hasnasal congestion but no  sore throat or post nasal drainage   Respiratory:  Denies cough, shortness of breath or dyspnea on exertion    Cardiovascular:  Denies chest pain or edema   GI:  Denies abdominal pain, nausea, vomiting, bloody stools or diarrhea .  No aerophagia.  :  Denies dysuria or nocturia   Musculoskeletal:  Denies back pain or joint pain   Integument:  Denies rash   Neurologic:  Denies headache, focal weakness or sensory changes. No history of stroke or seizures.   Endocrine:  Denies polyuria or polydipsia   Lymphatic:  Denies swollen glands   Psychiatric:  Denies depression, anxiety or claustrophobia      Physical Exam:     Vitals:    08/13/20 1523   BP: 116/78   Pulse: 75   SpO2: 98%   Weight: 77.1 kg (170 lb)   Height: 170.2 cm (67\")     HEENT: Head is normocephalic, atraumatic, normal distribution of hair. Pupils reactive to light. Ocular movements intact. mild nasal congestion on sniff test. No deviated nasal septum. No micrognathia.  Throat: Mallapatti stage 4, tongue midline, mucosa moist.  Neck: no JVD, thyromegaly, mass, +midline trachea, Neck circumference 15 inches  Lungs: clear, no wheeze, rhonchi, crackles  Cardiovascular: S1, S2, RRR no murmurs, rubs or gallops  Abd: +BS's soft NT/ND, no CVA tenderness.    Ext: no edema, cyanosis, clubbing, pulses intact  Neuro: Awake alert, oriented to time place and person. Grossly intact to motor, sensory cerebral, and cerebellar (without focal deficit)  Psych: No overt rolly, depression, psychosis or inappropriate behavior  Skin: No rash, cellulitis, or ulcerations.  No facial rash or erosions    Diagnostic data;  Memory card download shows data from 2/13/2020 to 8/12/2020.  Overall 182 days of data reviewed.  Percentage of use is 98.4%.  Maximum hours " use 8 hours 9 minutes . minimum time used 1 hour 13 minutes.  83.5% of the time the mask has been used for more than 4 hours.  The average apnea hypopnea index is 5.3 events per hour on an auto CPAP mode of therapy with a pressure range between 8-20 CWP.    Assessment/Plan:   Obstructive sleep apnea:  This is a very pleasant gentleman who underwent an unattended polysomnogram which showed presence of mild obstructive sleep apnea.  He was prescribed an auto CPAP mode of therapy.  The pressure range is presently between 12-20 CWP.  The pressure was decreased to 8-20 range during previous visit.  He has remained compliant with therapy.  He is getting benefit from it.    The results of memory card download was discussed in detail with the patient.  All questions were answered.  He has difficulty tolerating the mask because of the air leaks around the mask    Recommendation .  To change a full facemask to a different variety.  He is advised to continue using his full facemask.  He is advised to use the mask with given pressures a nightly basis.  He is advised to use the mask with given pressures for at least 4 hours a minimum benefit or as long as he sleeps for maximum benefit.  He is advised to use the mask with given pressures if he takes a nap during the daytime.  An extra small size of DreamWear full facemask.  2.  Allergic rhinitis.  He has remained symptomatic.  His upper airway congestion is interfering with adequate therapy for obstructive sleep apnea.  Recommendation.  Aggressive therapy is recommended.   Advised to use a steroid nasal spray which he can get over-the-counter.  1 spray to each nostril at night.  He will benefit from allergy testing.    3.  Sleep onset and sleep maintenance insomnia most likely related to underlying anxiety/depression.  Recommendation.  Aggressive therapy for underlying factors is recommended in order to control the symptoms of insomnia.    Driving instructions. Patient is advised  to avoid driving if tired or somnolent. To avoid all alcoholic beverages or medications causing somnolence.         Diagnosis Plan   1. Obstructive sleep apnea, adult  CPAP Therapy     No orders of the defined types were placed in this encounter.    Orders Placed This Encounter   Procedures   • CPAP Therapy     Order Specific Question:   Equipment:     Answer:   PAP Supplies Only     Order Specific Question:   PAP Tubing (1 per 3 months)     Answer:    6' Tubing with integrated heating element     Order Specific Question:   PAP Mask (1 per 3 months)     Answer:   Mask Fitting with DME Company     Order Specific Question:   PAP Accessories     Answer:    Water Chamber for PAP Humidifier (1 per 6 month) &  Filter PAP Disposable (2 per month) &  Filter PAP Non-Disposable (1 per 6 months) &  Chinstrap to be used with PAP (1 per 6 months) &  Headgear to be used with PAP (1 per 6 mo)     Order Specific Question:   Does the patient have Obstructive Sleep Apnea or other qualifying diagnosis for PAP ordered?     Answer:   Yes     Order Specific Question:   Does the patient require humidification?     Answer:   Yes     Order Specific Question:   Humidifier     Answer:    Humidifier Heated for CPAP or BiPAP     Order Specific Question:   If ordering a BiPAP for TREY a CPAP has been tried and/or ruled out?     Answer:   Does not apply     Order Specific Question:   The patient requires a PAP Device & continued use of Supplies to administer effective PAP therapy at the frequency of use ordered above     Answer:   Yes     Order Specific Question:   Initial Supplies and refills authorized for 12 months?     Answer:   Yes     Order Specific Question:   Which DME company is this being sent to?     Answer:   [facility] [[id number]]     Order Specific Question:   Length of Need (99 Months = Lifetime)     Answer:   99 Months = Lifetime         Raina Keith MD  8/13/2020  15:54

## 2020-08-28 ENCOUNTER — OFFICE VISIT (OUTPATIENT)
Dept: FAMILY MEDICINE CLINIC | Facility: CLINIC | Age: 65
End: 2020-08-28

## 2020-08-28 VITALS
BODY MASS INDEX: 27 KG/M2 | WEIGHT: 172 LBS | DIASTOLIC BLOOD PRESSURE: 80 MMHG | HEART RATE: 87 BPM | SYSTOLIC BLOOD PRESSURE: 120 MMHG | HEIGHT: 67 IN | TEMPERATURE: 98 F

## 2020-08-28 DIAGNOSIS — N45.1 EPIDIDYMITIS, LEFT: Primary | ICD-10-CM

## 2020-08-28 PROCEDURE — 99213 OFFICE O/P EST LOW 20 MIN: CPT | Performed by: PHYSICIAN ASSISTANT

## 2020-08-28 RX ORDER — LEVOFLOXACIN 500 MG/1
500 TABLET, FILM COATED ORAL DAILY
Qty: 10 TABLET | Refills: 0 | Status: SHIPPED | OUTPATIENT
Start: 2020-08-28 | End: 2020-09-07

## 2020-08-28 NOTE — PROGRESS NOTES
"Subjective   Sourav Proctor is a 64 y.o. male.     Chief Complaint   Patient presents with   • Testicle Pain       /80 (BP Location: Right arm, Patient Position: Sitting, Cuff Size: Adult)   Pulse 87   Temp 98 °F (36.7 °C) (Temporal)   Ht 170.2 cm (67\")   Wt 78 kg (172 lb)   BMI 26.94 kg/m²     BP Readings from Last 3 Encounters:   08/28/20 120/80   08/13/20 116/78   08/07/20 129/82       Wt Readings from Last 3 Encounters:   08/28/20 78 kg (172 lb)   08/13/20 77.1 kg (170 lb)   08/07/20 77.1 kg (170 lb)       HPI patient presents to the clinic with complaints of left testicular pain that is been present over the past 1 week.  He states that it has improved slightly over the past 1 week but he still complains of swelling and pain.  He states that he had similar symptoms approximately 20 years ago.  He is sexually active only with his wife and has been for the past 43 years.  He denies dysuria or polyuria.  He denies any trauma to the testicle.    The following portions of the patient's history were reviewed and updated as appropriate: allergies, current medications, past family history, past medical history, past social history, past surgical history and problem list.    Review of Systems   Constitutional: Negative for activity change, appetite change and fatigue.   HENT: Negative for congestion, rhinorrhea and sore throat.    Eyes: Negative for blurred vision, pain and visual disturbance.   Respiratory: Negative for cough, chest tightness and shortness of breath.    Cardiovascular: Negative for chest pain and leg swelling.   Gastrointestinal: Negative for abdominal pain, blood in stool and nausea.   Endocrine: Negative for polydipsia and polyuria.   Genitourinary: Positive for scrotal swelling (left) and testicular pain (left). Negative for dysuria and urgency.   Musculoskeletal: Negative for arthralgias and back pain.   Skin: Negative for rash and bruise.   Allergic/Immunologic: Negative for " environmental allergies.   Neurological: Negative for headache and confusion.   Hematological: Negative for adenopathy. Does not bruise/bleed easily.   Psychiatric/Behavioral: Negative for stress. The patient is not nervous/anxious.        Objective   Physical Exam   Constitutional: He is oriented to person, place, and time. He appears well-developed and well-nourished.   HENT:   Head: Normocephalic and atraumatic.   Eyes: Pupils are equal, round, and reactive to light. Conjunctivae and EOM are normal.   Neck: Normal range of motion. Neck supple.   Cardiovascular: Normal rate and regular rhythm.   No murmur heard.  Pulmonary/Chest: Effort normal and breath sounds normal.   Abdominal: Soft. Bowel sounds are normal. There is no tenderness.   Genitourinary:   Genitourinary Comments: Left testicle is swollen and tender.    Musculoskeletal: Normal range of motion. He exhibits no deformity.   Lymphadenopathy:     He has no cervical adenopathy.   Neurological: He is alert and oriented to person, place, and time. No cranial nerve deficit.   Skin: Skin is warm and dry. Capillary refill takes less than 2 seconds. No rash noted.   Psychiatric: He has a normal mood and affect. His behavior is normal. Judgment and thought content normal.         Diagnoses and all orders for this visit:    1. Epididymitis, left (Primary)  -     Urinalysis With Culture If Indicated -; Future    Other orders  -     levoFLOXacin (Levaquin) 500 MG tablet; Take 1 tablet by mouth Daily for 10 days.  Dispense: 10 tablet; Refill: 0        Return if symptoms worsen or fail to improve.

## 2020-11-25 DIAGNOSIS — Z00.00 PREVENTATIVE HEALTH CARE: Primary | ICD-10-CM

## 2020-12-03 ENCOUNTER — LAB (OUTPATIENT)
Dept: FAMILY MEDICINE CLINIC | Facility: CLINIC | Age: 65
End: 2020-12-03

## 2020-12-03 DIAGNOSIS — Z00.00 PREVENTATIVE HEALTH CARE: ICD-10-CM

## 2020-12-03 LAB
25(OH)D3 SERPL-MCNC: 28.6 NG/ML (ref 30–100)
ALBUMIN SERPL-MCNC: 4.7 G/DL (ref 3.5–5.2)
ALBUMIN/GLOB SERPL: 1.7 G/DL
ALP SERPL-CCNC: 54 U/L (ref 39–117)
ALT SERPL W P-5'-P-CCNC: 23 U/L (ref 1–41)
ANION GAP SERPL CALCULATED.3IONS-SCNC: 11.6 MMOL/L (ref 5–15)
AST SERPL-CCNC: 19 U/L (ref 1–40)
BASOPHILS # BLD AUTO: 0.03 10*3/MM3 (ref 0–0.2)
BASOPHILS NFR BLD AUTO: 0.5 % (ref 0–1.5)
BILIRUB SERPL-MCNC: 0.4 MG/DL (ref 0–1.2)
BUN SERPL-MCNC: 17 MG/DL (ref 8–23)
BUN/CREAT SERPL: 16.5 (ref 7–25)
CALCIUM SPEC-SCNC: 10 MG/DL (ref 8.6–10.5)
CHLORIDE SERPL-SCNC: 103 MMOL/L (ref 98–107)
CHOLEST SERPL-MCNC: 177 MG/DL (ref 0–200)
CO2 SERPL-SCNC: 28.4 MMOL/L (ref 22–29)
CREAT SERPL-MCNC: 1.03 MG/DL (ref 0.76–1.27)
DEPRECATED RDW RBC AUTO: 42.3 FL (ref 37–54)
EOSINOPHIL # BLD AUTO: 0.12 10*3/MM3 (ref 0–0.4)
EOSINOPHIL NFR BLD AUTO: 1.9 % (ref 0.3–6.2)
ERYTHROCYTE [DISTWIDTH] IN BLOOD BY AUTOMATED COUNT: 12 % (ref 12.3–15.4)
GFR SERPL CREATININE-BSD FRML MDRD: 73 ML/MIN/1.73
GLOBULIN UR ELPH-MCNC: 2.8 GM/DL
GLUCOSE SERPL-MCNC: 98 MG/DL (ref 65–99)
HCT VFR BLD AUTO: 45.1 % (ref 37.5–51)
HDLC SERPL-MCNC: 47 MG/DL (ref 40–60)
HGB BLD-MCNC: 15 G/DL (ref 13–17.7)
IMM GRANULOCYTES # BLD AUTO: 0.03 10*3/MM3 (ref 0–0.05)
IMM GRANULOCYTES NFR BLD AUTO: 0.5 % (ref 0–0.5)
LDLC SERPL CALC-MCNC: 107 MG/DL (ref 0–100)
LDLC/HDLC SERPL: 2.22 {RATIO}
LYMPHOCYTES # BLD AUTO: 1.55 10*3/MM3 (ref 0.7–3.1)
LYMPHOCYTES NFR BLD AUTO: 25.2 % (ref 19.6–45.3)
MCH RBC QN AUTO: 31.3 PG (ref 26.6–33)
MCHC RBC AUTO-ENTMCNC: 33.3 G/DL (ref 31.5–35.7)
MCV RBC AUTO: 94 FL (ref 79–97)
MONOCYTES # BLD AUTO: 0.62 10*3/MM3 (ref 0.1–0.9)
MONOCYTES NFR BLD AUTO: 10.1 % (ref 5–12)
NEUTROPHILS NFR BLD AUTO: 3.81 10*3/MM3 (ref 1.7–7)
NEUTROPHILS NFR BLD AUTO: 61.8 % (ref 42.7–76)
NRBC BLD AUTO-RTO: 0 /100 WBC (ref 0–0.2)
PLATELET # BLD AUTO: 258 10*3/MM3 (ref 140–450)
PMV BLD AUTO: 10.5 FL (ref 6–12)
POTASSIUM SERPL-SCNC: 4.4 MMOL/L (ref 3.5–5.2)
PROT SERPL-MCNC: 7.5 G/DL (ref 6–8.5)
RBC # BLD AUTO: 4.8 10*6/MM3 (ref 4.14–5.8)
SODIUM SERPL-SCNC: 143 MMOL/L (ref 136–145)
TRIGL SERPL-MCNC: 129 MG/DL (ref 0–150)
VLDLC SERPL-MCNC: 23 MG/DL (ref 5–40)
WBC # BLD AUTO: 6.16 10*3/MM3 (ref 3.4–10.8)

## 2020-12-03 PROCEDURE — 85025 COMPLETE CBC W/AUTO DIFF WBC: CPT | Performed by: INTERNAL MEDICINE

## 2020-12-03 PROCEDURE — 36415 COLL VENOUS BLD VENIPUNCTURE: CPT

## 2020-12-03 PROCEDURE — 84403 ASSAY OF TOTAL TESTOSTERONE: CPT | Performed by: INTERNAL MEDICINE

## 2020-12-03 PROCEDURE — 80061 LIPID PANEL: CPT | Performed by: INTERNAL MEDICINE

## 2020-12-03 PROCEDURE — 82306 VITAMIN D 25 HYDROXY: CPT | Performed by: INTERNAL MEDICINE

## 2020-12-03 PROCEDURE — 84402 ASSAY OF FREE TESTOSTERONE: CPT | Performed by: INTERNAL MEDICINE

## 2020-12-03 PROCEDURE — 80053 COMPREHEN METABOLIC PANEL: CPT | Performed by: INTERNAL MEDICINE

## 2020-12-08 LAB
TESTOST FREE SERPL-MCNC: 8.8 PG/ML (ref 6.6–18.1)
TESTOST SERPL-MCNC: 614.5 NG/DL (ref 264–916)

## 2020-12-10 ENCOUNTER — OFFICE VISIT (OUTPATIENT)
Dept: FAMILY MEDICINE CLINIC | Facility: CLINIC | Age: 65
End: 2020-12-10

## 2020-12-10 VITALS
BODY MASS INDEX: 26.56 KG/M2 | SYSTOLIC BLOOD PRESSURE: 118 MMHG | WEIGHT: 169.2 LBS | TEMPERATURE: 97.5 F | HEART RATE: 78 BPM | DIASTOLIC BLOOD PRESSURE: 70 MMHG | OXYGEN SATURATION: 98 % | RESPIRATION RATE: 16 BRPM | HEIGHT: 67 IN

## 2020-12-10 DIAGNOSIS — Z00.00 PREVENTATIVE HEALTH CARE: Primary | ICD-10-CM

## 2020-12-10 DIAGNOSIS — K21.9 GASTROESOPHAGEAL REFLUX DISEASE WITHOUT ESOPHAGITIS: ICD-10-CM

## 2020-12-10 DIAGNOSIS — E78.2 MIXED HYPERLIPIDEMIA: ICD-10-CM

## 2020-12-10 PROCEDURE — 99396 PREV VISIT EST AGE 40-64: CPT | Performed by: INTERNAL MEDICINE

## 2020-12-10 RX ORDER — PEN NEEDLE, DIABETIC 29 G X1/2"
NEEDLE, DISPOSABLE MISCELLANEOUS SEE ADMIN INSTRUCTIONS
COMMUNITY
Start: 2020-10-12

## 2020-12-10 NOTE — PROGRESS NOTES
"Subjective   Sourav Proctor is a 64 y.o. male.     Pt is here for an annual physical  And to follow up lab results  Overall, he's been feeling quite well  No chest pain, soa  No abd pain, n/v/d  No fever, cough  No headache  No dizziness  No palpitations       The following portions of the patient's history were reviewed and updated as appropriate: allergies, current medications, past family history, past medical history, past social history, past surgical history and problem list.    Review of Systems   Constitutional: Negative for fatigue and fever.   HENT: Negative for congestion, ear pain, rhinorrhea and sore throat.    Eyes: Negative for blurred vision and itching.   Respiratory: Negative for cough and shortness of breath.    Cardiovascular: Negative for chest pain and palpitations.   Gastrointestinal: Negative for abdominal pain, diarrhea and vomiting.   Endocrine: Negative for polydipsia and polyuria.   Genitourinary: Negative for dysuria, frequency, hematuria and urgency.   Musculoskeletal: Negative for joint swelling and myalgias.   Skin: Negative for rash and skin lesions.   Neurological: Negative for dizziness, numbness and headache.   Psychiatric/Behavioral: Negative for depressed mood. The patient is not nervous/anxious.          Current Outpatient Medications:   •  B-D INS SYR ULTRAFINE 1CC/30G 30G X 1/2\" 1 ML misc, See Admin Instructions., Disp: , Rfl:   •  NON FORMULARY, Trimix 30mg/2mg/20mcg Per Ml Injection, Disp: , Rfl:   •  aspirin 81 MG chewable tablet, Chew 81 mg Daily., Disp: , Rfl:   •  Chondroitin Sulfate 400 MG capsule, , Disp: , Rfl:   •  omeprazole (priLOSEC) 40 MG capsule, Take 1 capsule by mouth Daily., Disp: 90 capsule, Rfl: 3  •  simvastatin (ZOCOR) 10 MG tablet, Take 1 tablet by mouth Every Night., Disp: 90 tablet, Rfl: 3    Objective   /70 (BP Location: Left arm, Patient Position: Sitting, Cuff Size: Adult)   Pulse 78   Temp 97.5 °F (36.4 °C) (Temporal)   Resp 16   Ht 170.2 " "cm (67\")   Wt 76.7 kg (169 lb 3.2 oz)   SpO2 98%   BMI 26.50 kg/m²   Physical Exam  Vitals signs reviewed.   Constitutional:       General: He is not in acute distress.     Appearance: He is well-developed. He is not diaphoretic.   HENT:      Head: Normocephalic and atraumatic.      Right Ear: External ear normal.      Left Ear: External ear normal.      Nose: Nose normal.      Mouth/Throat:      Pharynx: No oropharyngeal exudate.   Eyes:      General: No scleral icterus.        Right eye: No discharge.         Left eye: No discharge.      Conjunctiva/sclera: Conjunctivae normal.   Neck:      Musculoskeletal: Normal range of motion and neck supple.      Thyroid: No thyromegaly.   Cardiovascular:      Rate and Rhythm: Normal rate and regular rhythm.      Heart sounds: Normal heart sounds. No murmur. No friction rub. No gallop.    Pulmonary:      Effort: Pulmonary effort is normal. No respiratory distress.      Breath sounds: Normal breath sounds. No wheezing or rales.   Abdominal:      General: Bowel sounds are normal. There is no distension.      Palpations: Abdomen is soft.      Tenderness: There is no abdominal tenderness. There is no guarding.   Musculoskeletal: Normal range of motion.         General: No deformity.   Lymphadenopathy:      Cervical: No cervical adenopathy.   Skin:     General: Skin is warm and dry.      Findings: No erythema or rash.   Neurological:      Mental Status: He is alert and oriented to person, place, and time.      Cranial Nerves: No cranial nerve deficit.   Psychiatric:         Behavior: Behavior normal.         Thought Content: Thought content normal.           Assessment/Plan   Problems Addressed this Visit        Cardiovascular and Mediastinum    Hyperlipidemia       Digestive    Gastroesophageal reflux disease      Other Visit Diagnoses     Preventative health care    -  Primary      Diagnoses       Codes Comments    Preventative health care    -  Primary ICD-10-CM: " Z00.00  ICD-9-CM: V70.0     Mixed hyperlipidemia     ICD-10-CM: E78.2  ICD-9-CM: 272.2     Gastroesophageal reflux disease without esophagitis     ICD-10-CM: K21.9  ICD-9-CM: 530.81         Labs are all acceptable  colonoscopy - 12/2015, +1-2 polyps; due q5 years  He is due this year, but not look forward to it  Would like to wait and order colonoscopy at next visit  Continue to work on diet/exercise bc of hpld and elevated BMI         Procedures

## 2020-12-21 ENCOUNTER — TELEPHONE (OUTPATIENT)
Dept: FAMILY MEDICINE CLINIC | Facility: CLINIC | Age: 65
End: 2020-12-21

## 2020-12-21 NOTE — TELEPHONE ENCOUNTER
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  HE WANTS TO KNOW IF HE SHOULD GET TESTED. HE IS NOT SHOWING ANY SYMPTOMS.     PLEASE CALL [phone]

## 2021-01-04 DIAGNOSIS — E78.2 MIXED HYPERLIPIDEMIA: ICD-10-CM

## 2021-01-04 RX ORDER — SIMVASTATIN 10 MG
10 TABLET ORAL NIGHTLY
Qty: 90 TABLET | Refills: 3 | Status: SHIPPED | OUTPATIENT
Start: 2021-01-04 | End: 2021-12-17

## 2021-03-30 DIAGNOSIS — K21.9 GASTROESOPHAGEAL REFLUX DISEASE WITHOUT ESOPHAGITIS: ICD-10-CM

## 2021-03-30 RX ORDER — OMEPRAZOLE 40 MG/1
40 CAPSULE, DELAYED RELEASE ORAL DAILY
Qty: 90 CAPSULE | Refills: 3 | Status: SHIPPED | OUTPATIENT
Start: 2021-03-30 | End: 2022-03-25 | Stop reason: SDUPTHER

## 2021-04-13 ENCOUNTER — OFFICE VISIT (OUTPATIENT)
Dept: SLEEP MEDICINE | Facility: CLINIC | Age: 66
End: 2021-04-13

## 2021-04-13 VITALS
BODY MASS INDEX: 26.68 KG/M2 | SYSTOLIC BLOOD PRESSURE: 124 MMHG | HEART RATE: 82 BPM | WEIGHT: 170 LBS | DIASTOLIC BLOOD PRESSURE: 82 MMHG | HEIGHT: 67 IN | OXYGEN SATURATION: 99 %

## 2021-04-13 DIAGNOSIS — Z99.89 OSA ON CPAP: Primary | ICD-10-CM

## 2021-04-13 DIAGNOSIS — G47.33 OSA ON CPAP: Primary | ICD-10-CM

## 2021-04-13 PROCEDURE — G0463 HOSPITAL OUTPT CLINIC VISIT: HCPCS

## 2021-04-13 PROCEDURE — 99214 OFFICE O/P EST MOD 30 MIN: CPT | Performed by: INTERNAL MEDICINE

## 2021-04-13 NOTE — PROGRESS NOTES
"  [facility]  [address] [place] [address]  Phone   Fax       SLEEP CLINIC FOLLOW UP PROGRESS NOTE.    Sourav Proctor  1955  65 y.o.  male      PCP: Chayito Freedman MD (Inactive)      Date of visit: 4/13/2021    Chief Complaint   Patient presents with   • Sleep Apnea   • Follow-up       HPI:  This is a 65 y.o. years old patient who has a history of obstructive sleep apnea is here for  the  compliance follow-up.  Sleep apnea is mild in severity.  Is a new patient to me.  Patient is using positive airway pressure therapy and the symptoms of snoring and daytime excessive sleepiness have improved significantly on the therapy. Normally goes to bed at 10 PM and wakes up at 5 AM.  The patient wakes up 3 time(s) during the night and has no problem going back to sleep.  Feels refreshed after waking up.  Patient also denies headaches but complains of nasal congestion specially in the spring and fall.      Past Medical History:   Diagnosis Date   • Anemia, iron deficiency    • Benign prostatic hyperplasia 4/5/2018   • Bilateral presbyopia 12/6/2019   • Combined forms of age-related cataract, bilateral 12/6/2019   • DJD (degenerative joint disease) of cervical spine    • Gastroesophageal reflux disease 10/24/2016   • GERD (gastroesophageal reflux disease)    • Hypercholesterolemia    • Hyperlipidemia 4/25/2016   • Inguinal lymphadenopathy 9/26/2017   • Keratitis 10/23/2017   • Male erectile disorder (CODE) 4/25/2016   • Nuclear senile cataract of both eyes 12/6/2019   • Other specified retinal disorders 12/6/2019   • PVD (posterior vitreous detachment), right eye 12/6/2019   • Sleep apnea, obstructive    • Subcutaneous nodule 4/24/2017   • Vitreous degeneration, bilateral 12/6/2019       MEDICATIONS: reviewed by me    Current Outpatient Medications:   •  aspirin 81 MG chewable tablet, Chew 81 mg Daily., Disp: , Rfl:   •  B-D INS SYR ULTRAFINE 1CC/30G 30G X 1/2\" 1 " "ML misc, See Admin Instructions., Disp: , Rfl:   •  Chondroitin Sulfate 400 MG capsule, , Disp: , Rfl:   •  NON FORMULARY, Trimix 30mg/2mg/20mcg Per Ml Injection, Disp: , Rfl:   •  omeprazole (priLOSEC) 40 MG capsule, Take 1 capsule by mouth Daily., Disp: 90 capsule, Rfl: 3  •  simvastatin (ZOCOR) 10 MG tablet, Take 1 tablet by mouth Every Night., Disp: 90 tablet, Rfl: 3    No Known Allergies reviewed by me    SOCIAL, FAMILY HISTORY: Medical records are reviewed and noted by me.  History of smoking no  History of alcohol use 3-4  Caffeine use 3    REVIEW OF SYSTEMS:   Woodruff Sleepiness Scale :Total score: 8   Snoring: Resolved  Morning headache: No  Nasal congestion: Yes  Leg movements: No  Heart burn no      PHYSICAL EXAMINATION:  CONSTITUTIONAL:  Vitals:    04/13/21 1534   BP: 124/82   Pulse: 82   SpO2: 99%   Weight: 77.1 kg (170 lb)   Height: 170.2 cm (67\")    Body mass index is 26.63 kg/m².    EYES: pupils are round equal and reacting to light and accommodation,   NOSE: nasal passages are clear, no nasal polyps, septum in the midline.  THROAT: throat is clear, oral airway Mallampati class 3  RESP SYSTEM: Breath sounds are normal, no wheezes or crackles  CARDIOVASULAR: Heart rate is regular without murmur. No edema  Musculosketal: No cyanosis, No clubbing, gait is normal      The Smart card downloaded on 4/13/2021 has been reviewed independently by me for compliance and discussed the data with the patient  Compliance; 95%  > 4 hr use, 83%  Average use of the device 5 hours and 56 minutes per night  Residual AHI: 5.8 /hr (normal less than 5)  Mask type: Full facemask  DME: [employer]    ASSESSMENT AND PLAN:  · Obstructive sleep apnea ( G 47.33).  The symptoms of sleep apnea have improved with the device and the treatment.  Patient's compliance with the device is excellent for treatment of sleep apnea.  I have independently reviewed the smart card down load and discussed with the patient the download data " and encouarged the patient to continue to use the device.The residual AHI is acceptable. The device is benefiting the patient and the device is medically necessary. The patient is also instructed to get the supplies from the DME company and and change them on a regular basis.  A prescription for supplies has been sent to the DME company.  I have also discussed the good sleep hygiene habits and adequate amount of sleep needed for good health.  · Allergic rhinitis.  New problem,  patient has allergic rhinitis especially during spring and fall.  I have asked him to buy over-the-counter Nasonex nasal spray and use 1 squirt in each nostril 20 minutes before using the CPAP.  · Return to clinic in 12 months for follow-up and patient also instructed to call the sleep clinic for any problems.  All the patient's questions were answered.        Anastasia Handy MD  Sleep Medicine  Medical Director for [facility]  4/13/2021     Addendum  Sleep apnea is is moderate only in supine position while sleeping on the back.  I have reviewed the use sleep study which shows no evidence of sleep apnea when he sleeps on the side  Patient can use positional therapy with sleeping on the side avoiding sleeping on the back  Anastasia Handy MD  8/10/2021

## 2021-04-28 ENCOUNTER — OFFICE VISIT (OUTPATIENT)
Dept: FAMILY MEDICINE CLINIC | Facility: CLINIC | Age: 66
End: 2021-04-28

## 2021-04-28 VITALS
WEIGHT: 171 LBS | HEIGHT: 67 IN | TEMPERATURE: 97.3 F | DIASTOLIC BLOOD PRESSURE: 78 MMHG | SYSTOLIC BLOOD PRESSURE: 122 MMHG | OXYGEN SATURATION: 98 % | HEART RATE: 81 BPM | BODY MASS INDEX: 26.84 KG/M2

## 2021-04-28 DIAGNOSIS — N50.812 PAIN IN BOTH TESTICLES: Primary | ICD-10-CM

## 2021-04-28 DIAGNOSIS — N50.811 PAIN IN BOTH TESTICLES: Primary | ICD-10-CM

## 2021-04-28 PROCEDURE — 99213 OFFICE O/P EST LOW 20 MIN: CPT | Performed by: NURSE PRACTITIONER

## 2021-04-28 NOTE — PROGRESS NOTES
"Subjective   Sourav Proctor is a 65 y.o. male.     Chief Complaint   Patient presents with   • Groin Pain     predominaletly on left side but flares into testicles       /78 (BP Location: Left arm, Patient Position: Sitting, Cuff Size: Adult)   Pulse 81   Temp 97.3 °F (36.3 °C) (Skin)   Ht 170.2 cm (67\")   Wt 77.6 kg (171 lb)   SpO2 98%   BMI 26.78 kg/m²     BP Readings from Last 3 Encounters:   04/28/21 122/78   04/13/21 124/82   12/10/20 118/70       Wt Readings from Last 3 Encounters:   04/28/21 77.6 kg (171 lb)   04/13/21 77.1 kg (170 lb)   12/10/20 76.7 kg (169 lb 3.2 oz)       Pt comes in today with c/o testicular pain. Symptoms started about 3 weeks ago in left testicle. No injuries. At first he thought he might be getting epididymitis as he has had it before, but felt different. Symptoms resolved somewhat, but then this weekend developed significant pain in both testicles and penis. Again, no injuries. No swelling. No urinary difficulties. No fever or chills.   Taking tylenol and IBU w/ little relief.        The following portions of the patient's history were reviewed and updated as appropriate: allergies, current medications, past family history, past medical history, past social history, past surgical history and problem list.    Review of Systems   Genitourinary: Positive for testicular pain. Negative for scrotal swelling.       Objective   Physical Exam  Constitutional:       Appearance: He is well-developed.   Eyes:      Pupils: Pupils are equal, round, and reactive to light.   Cardiovascular:      Rate and Rhythm: Normal rate and regular rhythm.   Pulmonary:      Effort: Pulmonary effort is normal.      Breath sounds: Normal breath sounds.   Genitourinary:     Penis: Normal.       Testes:         Right: Tenderness present.         Left: Mass and tenderness present.   Neurological:      Mental Status: He is alert and oriented to person, place, and time.           Diagnoses and all orders for " this visit:    1. Pain in both testicles (Primary)    pt is going to follow up with Dr. Ha tomorrow at 10:00AM  During this office visit, we discussed the pertinent aspects of the visit and treatment recommendations. Pt verbalizes understanding. Follow up was discussed. Patient was given the opportunity to ask questions and discuss other concerns.       Return if symptoms worsen or fail to improve.

## 2021-06-07 DIAGNOSIS — I10 ESSENTIAL HYPERTENSION: ICD-10-CM

## 2021-06-07 DIAGNOSIS — E78.2 MIXED HYPERLIPIDEMIA: Primary | ICD-10-CM

## 2021-06-08 ENCOUNTER — LAB (OUTPATIENT)
Dept: FAMILY MEDICINE CLINIC | Facility: CLINIC | Age: 66
End: 2021-06-08

## 2021-06-08 DIAGNOSIS — I10 ESSENTIAL HYPERTENSION: ICD-10-CM

## 2021-06-08 DIAGNOSIS — E78.2 MIXED HYPERLIPIDEMIA: ICD-10-CM

## 2021-06-08 LAB
ALBUMIN SERPL-MCNC: 4.2 G/DL (ref 3.5–5.2)
ALBUMIN/GLOB SERPL: 1.8 G/DL
ALP SERPL-CCNC: 58 U/L (ref 39–117)
ALT SERPL W P-5'-P-CCNC: 20 U/L (ref 1–41)
ANION GAP SERPL CALCULATED.3IONS-SCNC: 9.9 MMOL/L (ref 5–15)
AST SERPL-CCNC: 15 U/L (ref 1–40)
BILIRUB SERPL-MCNC: 0.4 MG/DL (ref 0–1.2)
BUN SERPL-MCNC: 12 MG/DL (ref 8–23)
BUN/CREAT SERPL: 12.1 (ref 7–25)
CALCIUM SPEC-SCNC: 9.3 MG/DL (ref 8.6–10.5)
CHLORIDE SERPL-SCNC: 106 MMOL/L (ref 98–107)
CHOLEST SERPL-MCNC: 178 MG/DL (ref 0–200)
CO2 SERPL-SCNC: 28.1 MMOL/L (ref 22–29)
CREAT SERPL-MCNC: 0.99 MG/DL (ref 0.76–1.27)
GFR SERPL CREATININE-BSD FRML MDRD: 76 ML/MIN/1.73
GLOBULIN UR ELPH-MCNC: 2.4 GM/DL
GLUCOSE SERPL-MCNC: 94 MG/DL (ref 65–99)
HDLC SERPL-MCNC: 46 MG/DL (ref 40–60)
LDLC SERPL CALC-MCNC: 111 MG/DL (ref 0–100)
LDLC/HDLC SERPL: 2.35 {RATIO}
POTASSIUM SERPL-SCNC: 5.1 MMOL/L (ref 3.5–5.2)
PROT SERPL-MCNC: 6.6 G/DL (ref 6–8.5)
SODIUM SERPL-SCNC: 144 MMOL/L (ref 136–145)
TRIGL SERPL-MCNC: 119 MG/DL (ref 0–150)
VLDLC SERPL-MCNC: 21 MG/DL (ref 5–40)

## 2021-06-08 PROCEDURE — 80061 LIPID PANEL: CPT | Performed by: PHYSICIAN ASSISTANT

## 2021-06-08 PROCEDURE — 80053 COMPREHEN METABOLIC PANEL: CPT | Performed by: PHYSICIAN ASSISTANT

## 2021-06-08 PROCEDURE — 36415 COLL VENOUS BLD VENIPUNCTURE: CPT

## 2021-06-15 ENCOUNTER — OFFICE VISIT (OUTPATIENT)
Dept: FAMILY MEDICINE CLINIC | Facility: CLINIC | Age: 66
End: 2021-06-15

## 2021-06-15 VITALS
RESPIRATION RATE: 16 BRPM | DIASTOLIC BLOOD PRESSURE: 78 MMHG | OXYGEN SATURATION: 97 % | HEART RATE: 79 BPM | WEIGHT: 170.25 LBS | SYSTOLIC BLOOD PRESSURE: 126 MMHG | BODY MASS INDEX: 26.72 KG/M2 | HEIGHT: 67 IN

## 2021-06-15 DIAGNOSIS — H93.13 TINNITUS OF BOTH EARS: ICD-10-CM

## 2021-06-15 DIAGNOSIS — K21.9 GASTROESOPHAGEAL REFLUX DISEASE, UNSPECIFIED WHETHER ESOPHAGITIS PRESENT: ICD-10-CM

## 2021-06-15 DIAGNOSIS — Z00.00 ANNUAL PHYSICAL EXAM: Primary | ICD-10-CM

## 2021-06-15 DIAGNOSIS — E78.2 MIXED HYPERLIPIDEMIA: ICD-10-CM

## 2021-06-15 PROBLEM — N40.0 BENIGN PROSTATIC HYPERPLASIA: Status: RESOLVED | Noted: 2018-04-05 | Resolved: 2021-06-15

## 2021-06-15 PROCEDURE — 99213 OFFICE O/P EST LOW 20 MIN: CPT | Performed by: PHYSICIAN ASSISTANT

## 2021-06-15 PROCEDURE — 99396 PREV VISIT EST AGE 40-64: CPT | Performed by: PHYSICIAN ASSISTANT

## 2021-06-15 NOTE — PROGRESS NOTES
"Subjective   Sourav Proctor is a 65 y.o. male.     Chief Complaint   Patient presents with   • Hypertension     6 month f/u       /78 (BP Location: Right arm, Patient Position: Sitting, Cuff Size: Adult)   Pulse 79   Resp 16   Ht 170.2 cm (67\")   Wt 77.2 kg (170 lb 4 oz)   SpO2 97%   BMI 26.66 kg/m²     BP Readings from Last 3 Encounters:   06/15/21 126/78   04/28/21 122/78   04/13/21 124/82       Wt Readings from Last 3 Encounters:   06/15/21 77.2 kg (170 lb 4 oz)   04/28/21 77.6 kg (171 lb)   04/13/21 77.1 kg (170 lb)       HPI Patient presents to the clinic for an annual physical exam and for management of HLD. Has had allergies recently worsening that is bothering his use of cpap. Seeing urology for testicular issues. Having ultrasound this week. No burning with urination. No prostate pain. No sex discomfort. Does not have any prostate pain that he can tell. Has bladder discomfort at times. Has had tinnitus for quite some time. Has had previous hearing testing 6 years ago and had slight loss in hearing.     The following portions of the patient's history were reviewed and updated as appropriate: allergies, current medications, past family history, past medical history, past social history, past surgical history and problem list.    Review of Systems   Constitutional: Negative for activity change, appetite change and fatigue.   HENT: Negative for congestion, rhinorrhea and sore throat.    Eyes: Negative for blurred vision, pain and visual disturbance.   Respiratory: Negative for cough, chest tightness and shortness of breath.    Cardiovascular: Negative for chest pain and leg swelling.   Gastrointestinal: Negative for abdominal pain, blood in stool and nausea.   Endocrine: Negative for polydipsia and polyuria.   Genitourinary: Negative for dysuria and urgency.   Musculoskeletal: Negative for arthralgias and back pain.   Skin: Negative for rash and bruise.   Allergic/Immunologic: Negative for " environmental allergies.   Neurological: Negative for headache and confusion.   Hematological: Negative for adenopathy. Does not bruise/bleed easily.   Psychiatric/Behavioral: Negative for stress. The patient is not nervous/anxious.        Objective   Physical Exam  Constitutional:       Appearance: He is well-developed.   HENT:      Head: Normocephalic and atraumatic.   Eyes:      Conjunctiva/sclera: Conjunctivae normal.      Pupils: Pupils are equal, round, and reactive to light.   Cardiovascular:      Rate and Rhythm: Normal rate and regular rhythm.      Heart sounds: No murmur heard.     Pulmonary:      Effort: Pulmonary effort is normal.      Breath sounds: Normal breath sounds.   Abdominal:      General: Bowel sounds are normal.      Palpations: Abdomen is soft.      Tenderness: There is no abdominal tenderness.   Musculoskeletal:         General: No deformity. Normal range of motion.      Cervical back: Normal range of motion and neck supple.   Lymphadenopathy:      Cervical: No cervical adenopathy.   Skin:     General: Skin is warm and dry.      Capillary Refill: Capillary refill takes less than 2 seconds.      Findings: No rash.   Neurological:      Mental Status: He is alert and oriented to person, place, and time.      Cranial Nerves: No cranial nerve deficit.   Psychiatric:         Behavior: Behavior normal.         Thought Content: Thought content normal.         Judgment: Judgment normal.           Diagnoses and all orders for this visit:    1. Annual physical exam (Primary)    2. Mixed hyperlipidemia  Comments:  LDL slightly up but ok- continue meds and watch diet and exrercise.     3. Gastroesophageal reflux disease, unspecified whether esophagitis present  Comments:  Prilosec.     4. Tinnitus of both ears  -     Ambulatory Referral to Audiology       lifestyle modifications including healthy eating habits and exercise. In general we discussed the benefits of a food plan based on higher intake of  plant based foods over meats. When eating meat, fish should be the first choice followed by other forms of meat. Red meat sparingly. Avoid processed foods and eat whole and organic foods if possible. Do not drink calories on a regular basis. Try incorporating olive oil or avocado oil rather than butter into your daily eating habits. Try daily exercise for at least 15 min per day 5 days a week. This can persist of any activity to increase your heart rate and break a sweat.     Return in about 1 year (around 6/15/2022), or if symptoms worsen or fail to improve, for Recheck, Annual physical.

## 2021-07-01 ENCOUNTER — TELEPHONE (OUTPATIENT)
Dept: FAMILY MEDICINE CLINIC | Facility: CLINIC | Age: 66
End: 2021-07-01

## 2021-07-01 NOTE — TELEPHONE ENCOUNTER
Caller: Sourav Proctor    Relationship: Self    Best call back number: [phone]    Who are you requesting to speak with (clinical staff, provider,  specific staff member):    REQUESTING A CALL WITH THE OVER THE COUNTER ALLERGY MEDICATION THAT WAS DISCUSSED AT HIS 6/15 OFFICE VISIT.      Do you require a callback: YES

## 2021-08-10 ENCOUNTER — TELEPHONE (OUTPATIENT)
Dept: SLEEP MEDICINE | Facility: CLINIC | Age: 66
End: 2021-08-10

## 2021-08-10 NOTE — TELEPHONE ENCOUNTER
Patient has been having trouble with his CPAP machine.  He isn't able to use it like it has been. He said he has a lot of events while using it. He wants to know what he needs to do.     Please advise.     30 day report scanned in under the media tab (CPAP DEVICE DATA, [facility], 07/11/2021-08/09/2021)

## 2021-08-11 NOTE — TELEPHONE ENCOUNTER
Patient states that he always sleeps on his side. He said that last night after 2 hours of use, his AHI was 13.1. He said over the weekend , he used it for 6-7 hours and still had over 9 AHI. He is just concerned about it not working. I did schedule an appointment for 08/24/2021.     Please advise if there is anything else to be done.

## 2021-08-17 NOTE — TELEPHONE ENCOUNTER
Spoke with patient and let him know that Dr Handy will discuss pt's concerns at appt. Pt verbally understood.

## 2021-08-24 ENCOUNTER — OFFICE VISIT (OUTPATIENT)
Dept: SLEEP MEDICINE | Facility: CLINIC | Age: 66
End: 2021-08-24

## 2021-08-24 VITALS
BODY MASS INDEX: 26.74 KG/M2 | HEART RATE: 90 BPM | OXYGEN SATURATION: 98 % | HEIGHT: 67 IN | DIASTOLIC BLOOD PRESSURE: 79 MMHG | SYSTOLIC BLOOD PRESSURE: 120 MMHG | WEIGHT: 170.4 LBS

## 2021-08-24 DIAGNOSIS — Z99.89 OSA ON CPAP: Primary | ICD-10-CM

## 2021-08-24 DIAGNOSIS — G47.33 OSA ON CPAP: Primary | ICD-10-CM

## 2021-08-24 PROCEDURE — 99212 OFFICE O/P EST SF 10 MIN: CPT | Performed by: INTERNAL MEDICINE

## 2021-08-24 PROCEDURE — G0463 HOSPITAL OUTPT CLINIC VISIT: HCPCS

## 2021-08-24 NOTE — PROGRESS NOTES
"  [address] [address]  Phone   Fax       SLEEP CLINIC FOLLOW UP PROGRESS NOTE.    Sourav Proctor  1955  65 y.o.  male      PCP: Celestine Payne PA-C      Date of visit: 8/24/2021    Chief Complaint   Patient presents with   • Sleep Apnea       HPI:  This is a 65 y.o. years old patient who has a history of obstructive sleep apnea is here for  the  compliance follow-up.  Patient was recently seen but he returns to the clinic saying that his residual AHI is still high.  I have reviewed his smart card download which shows that his AHI is averaging 7.3.  Patient has mild sleep apnea with AHI of 6/h and in supine position it was 15/h.    Medications and allergies are reviewed by me and documented in the encounter.     SOCIAL ( habits pertaining to sleep medicine)  History tobacco use:No   History of alcohol use: 4 per week  Caffeine use: 4     REVIEW OF SYSTEMS:   [place] Sleepiness Scale :Total score: 7   Nasal congestion:No   Dry mouth/nose:No   Post nasal drip; No   Acid reflux/Heartburn:No   Abd bloating:No   Morning headache:No   Anxiety:No   Depression:No    PHYSICAL EXAMINATION:  CONSTITUTIONAL:  Vitals:    08/24/21 1356   BP: 120/79   Pulse: 90   SpO2: 98%   Weight: 77.3 kg (170 lb 6.4 oz)   Height: 170.2 cm (67\")    Body mass index is 26.69 kg/m².   NOSE: nasal passages are clear, no nasal polyps, septum in the midline.  THROAT: throat is clear, oral airway Mallampati class 3  RESP SYSTEM: Breath sounds are normal, no wheezes or crackles  CARDIOVASULAR: Heart rate is regular without murmur. No edema      Data reviewed:  The Smart card downloaded on 8/24/2021 has been reviewed independently by me for compliance and discussed the data with the patient.   Compliance; 96%  More than 4 hr use, 43%  Average use of the device 3 hours and 39 per night  Residual AHI: 7.3 /hr (goal < 5.0 /hr)  Mask type: Full facemask  DME: [employer]        ASSESSMENT AND PLAN:  · Obstructive sleep apnea ( G 47.33).  I have changed the pressure setting on his auto CPAP.  Auto CPAP is set at 8-14 and of increased the flex to 3.  Patient will follow his download on the alley for few days and give us if back if he still continues to fail days he may need a BiPAP titration  · Return for Next scheduled follow-up as scheduled.. . Patient's questions were answered.        Anastasia Handy MD  Sleep Medicine.  Medical Director, [facility]  8/24/2021 ,

## 2021-09-29 ENCOUNTER — TELEPHONE (OUTPATIENT)
Dept: SLEEP MEDICINE | Facility: HOSPITAL | Age: 66
End: 2021-09-29

## 2021-09-29 NOTE — TELEPHONE ENCOUNTER
Pt left  re: PAP recall.  He was told of recall by his DME ([employer]).  They advised he contact his MD for advice on usage.  He is not using SoClean or any ozone .    I returned pt's call.  Advised he continue to NOT use the ozone cleaning devices.  He has already registered on [employer] website.  He will continue using his PAP.  Patient voiced understanding.

## 2021-12-10 DIAGNOSIS — E78.2 MIXED HYPERLIPIDEMIA: Primary | ICD-10-CM

## 2021-12-13 ENCOUNTER — LAB (OUTPATIENT)
Dept: FAMILY MEDICINE CLINIC | Facility: CLINIC | Age: 66
End: 2021-12-13

## 2021-12-13 DIAGNOSIS — E78.2 MIXED HYPERLIPIDEMIA: ICD-10-CM

## 2021-12-13 LAB
ALBUMIN SERPL-MCNC: 4.5 G/DL (ref 3.5–5.2)
ALBUMIN/GLOB SERPL: 1.6 G/DL
ALP SERPL-CCNC: 60 U/L (ref 39–117)
ALT SERPL W P-5'-P-CCNC: 19 U/L (ref 1–41)
ANION GAP SERPL CALCULATED.3IONS-SCNC: 10.3 MMOL/L (ref 5–15)
AST SERPL-CCNC: 20 U/L (ref 1–40)
BILIRUB SERPL-MCNC: 0.4 MG/DL (ref 0–1.2)
BUN SERPL-MCNC: 11 MG/DL (ref 8–23)
BUN/CREAT SERPL: 10.9 (ref 7–25)
CALCIUM SPEC-SCNC: 9.6 MG/DL (ref 8.6–10.5)
CHLORIDE SERPL-SCNC: 106 MMOL/L (ref 98–107)
CHOLEST SERPL-MCNC: 197 MG/DL (ref 0–200)
CO2 SERPL-SCNC: 26.7 MMOL/L (ref 22–29)
CREAT SERPL-MCNC: 1.01 MG/DL (ref 0.76–1.27)
GFR SERPL CREATININE-BSD FRML MDRD: 74 ML/MIN/1.73
GLOBULIN UR ELPH-MCNC: 2.8 GM/DL
GLUCOSE SERPL-MCNC: 113 MG/DL (ref 65–99)
HDLC SERPL-MCNC: 53 MG/DL (ref 40–60)
LDLC SERPL CALC-MCNC: 119 MG/DL (ref 0–100)
LDLC/HDLC SERPL: 2.18 {RATIO}
POTASSIUM SERPL-SCNC: 4.3 MMOL/L (ref 3.5–5.2)
PROT SERPL-MCNC: 7.3 G/DL (ref 6–8.5)
SODIUM SERPL-SCNC: 143 MMOL/L (ref 136–145)
TRIGL SERPL-MCNC: 142 MG/DL (ref 0–150)
VLDLC SERPL-MCNC: 25 MG/DL (ref 5–40)

## 2021-12-13 PROCEDURE — 80061 LIPID PANEL: CPT | Performed by: PHYSICIAN ASSISTANT

## 2021-12-13 PROCEDURE — 80053 COMPREHEN METABOLIC PANEL: CPT | Performed by: PHYSICIAN ASSISTANT

## 2021-12-13 PROCEDURE — 36415 COLL VENOUS BLD VENIPUNCTURE: CPT

## 2021-12-16 ENCOUNTER — OFFICE VISIT (OUTPATIENT)
Dept: FAMILY MEDICINE CLINIC | Facility: CLINIC | Age: 66
End: 2021-12-16

## 2021-12-16 VITALS
RESPIRATION RATE: 12 BRPM | SYSTOLIC BLOOD PRESSURE: 133 MMHG | BODY MASS INDEX: 27.18 KG/M2 | OXYGEN SATURATION: 98 % | WEIGHT: 173.2 LBS | DIASTOLIC BLOOD PRESSURE: 73 MMHG | HEART RATE: 88 BPM | HEIGHT: 67 IN

## 2021-12-16 DIAGNOSIS — M54.2 CERVICALGIA: ICD-10-CM

## 2021-12-16 DIAGNOSIS — E78.2 MIXED HYPERLIPIDEMIA: Primary | ICD-10-CM

## 2021-12-16 DIAGNOSIS — D50.9 IRON DEFICIENCY ANEMIA, UNSPECIFIED IRON DEFICIENCY ANEMIA TYPE: ICD-10-CM

## 2021-12-16 DIAGNOSIS — G25.0 BENIGN ESSENTIAL TREMOR: ICD-10-CM

## 2021-12-16 PROCEDURE — 99214 OFFICE O/P EST MOD 30 MIN: CPT | Performed by: PHYSICIAN ASSISTANT

## 2021-12-16 RX ORDER — FLUOROURACIL 50 MG/G
CREAM TOPICAL
COMMUNITY
Start: 2021-11-18 | End: 2022-06-02

## 2021-12-16 RX ORDER — EPINEPHRINE 0.3 MG/.3ML
INJECTION, SOLUTION INTRAMUSCULAR
COMMUNITY
Start: 2021-11-01

## 2021-12-16 RX ORDER — PREDNISONE 20 MG/1
TABLET ORAL
COMMUNITY
Start: 2021-11-29 | End: 2022-06-02

## 2021-12-16 RX ORDER — CETIRIZINE HYDROCHLORIDE 10 MG/1
TABLET ORAL
COMMUNITY
Start: 2021-11-29 | End: 2022-06-02

## 2021-12-16 RX ORDER — FAMOTIDINE 20 MG/1
TABLET, FILM COATED ORAL
COMMUNITY
Start: 2021-11-29 | End: 2022-06-02

## 2021-12-16 RX ORDER — MONTELUKAST SODIUM 10 MG/1
TABLET ORAL
COMMUNITY
Start: 2021-11-29 | End: 2022-06-02

## 2021-12-16 NOTE — PROGRESS NOTES
"Subjective   Sourav Proctor is a 65 y.o. male.     Chief Complaint   Patient presents with   • Hyperlipidemia       /73   Pulse 88   Resp 12   Ht 170.2 cm (67\")   Wt 78.6 kg (173 lb 3.2 oz)   SpO2 98%   BMI 27.13 kg/m²     BP Readings from Last 3 Encounters:   12/16/21 133/73   08/24/21 120/79   06/15/21 126/78       Wt Readings from Last 3 Encounters:   12/16/21 78.6 kg (173 lb 3.2 oz)   08/24/21 77.3 kg (170 lb 6.4 oz)   06/15/21 77.2 kg (170 lb 4 oz)       HPI Presents to the clinic for hld management. LDL was slightly elevated on labs done prior to visit today but stable. He is compliant with statin. Has noticed a tremor that has been present for the past few years. He notices this with intention. He tried to donate blood recently and was told his hgb was in the 12's and they would not let him donate blood. He started otc iron. He also has had cervical neck pain that does not radiate. Had this previously and saw PT and this helped. No family history of any neurological problems.     The following portions of the patient's history were reviewed and updated as appropriate: allergies, current medications, past family history, past medical history, past social history, past surgical history and problem list.    Review of Systems    Objective   Physical Exam  Constitutional:       Appearance: He is well-developed.   HENT:      Head: Normocephalic and atraumatic.   Eyes:      Conjunctiva/sclera: Conjunctivae normal.      Pupils: Pupils are equal, round, and reactive to light.   Cardiovascular:      Rate and Rhythm: Normal rate and regular rhythm.      Heart sounds: No murmur heard.      Pulmonary:      Effort: Pulmonary effort is normal.      Breath sounds: Normal breath sounds.   Abdominal:      General: Bowel sounds are normal.      Palpations: Abdomen is soft.      Tenderness: There is no abdominal tenderness.   Musculoskeletal:         General: No deformity. Normal range of motion.      Cervical back: " Normal range of motion and neck supple.   Lymphadenopathy:      Cervical: No cervical adenopathy.   Skin:     General: Skin is warm and dry.      Capillary Refill: Capillary refill takes less than 2 seconds.      Findings: No rash.   Neurological:      Mental Status: He is alert and oriented to person, place, and time.      Cranial Nerves: No cranial nerve deficit.   Psychiatric:         Behavior: Behavior normal.         Thought Content: Thought content normal.         Judgment: Judgment normal.           Diagnoses and all orders for this visit:    1. Mixed hyperlipidemia (Primary)    2. Benign essential tremor    3. Cervicalgia  -     Ambulatory Referral to Physical Therapy Evaluate and treat    4. Iron deficiency anemia, unspecified iron deficiency anemia type    Discussed options for BET and we will wait at this time. Neuro exam normal. Continue Iron and we will assess labs at next visit. HLD stable.     Return in about 6 months (around 6/16/2022), or if symptoms worsen or fail to improve.

## 2021-12-17 DIAGNOSIS — E78.2 MIXED HYPERLIPIDEMIA: ICD-10-CM

## 2021-12-17 RX ORDER — SIMVASTATIN 10 MG
TABLET ORAL
Qty: 90 TABLET | Refills: 3 | Status: SHIPPED | OUTPATIENT
Start: 2021-12-17 | End: 2022-11-28 | Stop reason: SDUPTHER

## 2022-01-10 ENCOUNTER — TREATMENT (OUTPATIENT)
Dept: PHYSICAL THERAPY | Facility: CLINIC | Age: 67
End: 2022-01-10

## 2022-01-10 DIAGNOSIS — M54.2 CERVICALGIA: Primary | ICD-10-CM

## 2022-01-10 PROCEDURE — 97110 THERAPEUTIC EXERCISES: CPT | Performed by: PHYSICAL THERAPIST

## 2022-01-10 PROCEDURE — 97140 MANUAL THERAPY 1/> REGIONS: CPT | Performed by: PHYSICAL THERAPIST

## 2022-01-10 PROCEDURE — 97162 PT EVAL MOD COMPLEX 30 MIN: CPT | Performed by: PHYSICAL THERAPIST

## 2022-01-10 NOTE — PROGRESS NOTES
Physical Therapy Initial Evaluation and Plan of Care    Patient: Sourav Proctor   : 1955  Diagnosis/ICD-10 Code:  Cervicalgia [M54.2]  Referring practitioner: Celestine Payne PA-C  Date of Initial Visit: 1/10/2022  Today's Date: 1/10/2022  Patient seen for 1 sessions           Subjective Questionnaire: NDI:16%      Subjective Evaluation    History of Present Illness  Mechanism of injury: Patient presents to physical therapy with cc of neck pain and stiffness.  Reports that these symptoms have been ongoing for the past 6-8 years, however have worsened recently.  Reports 1-2 headaches a week recently along with the neck pain.  Patient reports that his job requires him to work with his hands and he often finds himself with his chin forward for long periods of time, which leads to soreness.  Patient had prior PT intervention for similar symptoms 6-8 years ago and had decreased pain.  Wishes to have less pain and improved mobility with ADL and work activities.     Pain  Current pain ratin  Location: cervical spine (adelina), radiates into top of shoulders   Quality: sharp and tight  Relieving factors: change in position  Aggravating factors: prolonged positioning and sleeping  Progression: worsening    Treatments  Previous treatment: physical therapy  Patient Goals  Patient goals for therapy: decreased pain and increased motion             Objective          Static Posture     Head  Forward.    Shoulders  Rounded.    Tenderness     Additional Tenderness Details  TTP PA glide C7-T2, suboccipitals adelina TTP     Active Range of Motion   Cervical/Thoracic Spine   Cervical    Flexion: 25 degrees   Extension: 45 degrees   Left lateral flexion: 30 degrees   Right lateral flexion: 35 degrees   Left rotation: 53 degrees   Right rotation: 41 degrees     Strength/Myotome Testing   Cervical Spine     Left   Normal strength    Right   Normal strength          Assessment & Plan     Assessment  Impairments: abnormal or  restricted ROM and pain with function  Functional Limitations: uncomfortable because of pain  Assessment details: Patient presents to physical therapy with s/s congruent with MD diagnosis of cervicalgia.  Patient demonstrates limited AROM in cervical spine, postural deficits and elevated pain level with activity.  Patient would benefit from PT intervention in order to address these deficits so that he may complete work activities with less pain.   Prognosis: good    Goals  Plan Goals: In two weeks, patient will report at least 25% reduction in pain level.    In two weeks, patient will demonstrate at least 25% improvement in AROM in cervical spine.     In four weeks, patient will demonstrate at least 60 degrees of cervical rotation bilaterally.   In four weeks, patient will report completing 3 days of work without pain level over 1/10.  In four weeks, patient will demonstrate decreased perceived disability by decreasing score on NDI by at least 12%.    Plan  Therapy options: will be seen for skilled therapy services  Planned modality interventions: cryotherapy, dry needling, TENS, thermotherapy (hydrocollator packs) and traction  Planned therapy interventions: manual therapy, neuromuscular re-education, postural training, soft tissue mobilization, spinal/joint mobilization, strengthening, stretching, therapeutic activities, joint mobilization and home exercise program  Duration in visits: 20  Plan details: 1-2 times per week        Manual Therapy:    15     mins  79686;  Therapeutic Exercise:    10     mins  19847;     Neuromuscular Margot:        mins  12365;    Therapeutic Activity:          mins  88861;     Gait Training:           mins  89911;     Ultrasound:          mins  96158;    Electrical Stimulation:         mins  20723 ( );  Dry Needling          mins self-pay    Timed Treatment:   25   mins   Total Treatment:     45   mins    PT SIGNATURE: Jasvir Queen PT   DATE TREATMENT INITIATED:  1/10/2022    Initial Certification  Certification Period: 4/10/2022  I certify that the therapy services are furnished while this patient is under my care.  The services outlined above are required by this patient, and will be reviewed every 90 days.     PHYSICIAN: Celestine Payne PA-C      DATE:     Please sign and return via fax to [phone].. Thank you, [facility].

## 2022-01-18 ENCOUNTER — TREATMENT (OUTPATIENT)
Dept: PHYSICAL THERAPY | Facility: CLINIC | Age: 67
End: 2022-01-18

## 2022-01-18 DIAGNOSIS — M54.2 CERVICALGIA: Primary | ICD-10-CM

## 2022-01-18 PROCEDURE — 97140 MANUAL THERAPY 1/> REGIONS: CPT | Performed by: PHYSICAL THERAPIST

## 2022-01-18 PROCEDURE — 97110 THERAPEUTIC EXERCISES: CPT | Performed by: PHYSICAL THERAPIST

## 2022-01-18 PROCEDURE — 97530 THERAPEUTIC ACTIVITIES: CPT | Performed by: PHYSICAL THERAPIST

## 2022-01-18 NOTE — PROGRESS NOTES
Physical Therapy Daily Progress Note    VISIT#: 2    Subjective   Sourav Proctor reports that he has not had any changes with his neck symptoms but he has not been able to be very consistent with his HEP.   Pain Ratin    Objective     See Exercise, Manual, and Modality Logs for complete treatment.     Patient Education: exercise progressions, HEP    Assessment/Plan   Pt tolerated treatment well and was able to progress mobility and postural exercises. Added LS stretch and openbooks to HEP, pt showed good understanding. Pt had minimal to no c/o discomfort with manual therapy.     Progress per Plan of Care and Progress strengthening /stabilization /functional activity          Timed:         Manual Therapy:    12     mins  71796;     Therapeutic Exercise:    15     mins  12607;     Neuromuscular Margot:        mins  31679;    Therapeutic Activity:     10     mins  37403;     Gait Training:           mins  87665;     Ultrasound:          mins  52477;    Ionto                                   mins   84615  Self Care                            mins   94189  Canalith Repos                   mins  53271    Un-Timed:  Electrical Stimulation:         mins  52255 ( );  Dry Needling          mins self-pay  Traction          mins 51793  Low Eval          Mins  59927  Mod Eval          Mins  30105  High Eval                            Mins  84629  Re-Eval                               mins  90315    Timed Treatment:   37   mins   Total Treatment:     37   mins          Pallavi Olivier  Physical Therapist Assistant  [place] License # [id number]

## 2022-01-21 ENCOUNTER — TREATMENT (OUTPATIENT)
Dept: PHYSICAL THERAPY | Facility: CLINIC | Age: 67
End: 2022-01-21

## 2022-01-21 DIAGNOSIS — M54.2 CERVICALGIA: Primary | ICD-10-CM

## 2022-01-21 PROCEDURE — 97110 THERAPEUTIC EXERCISES: CPT | Performed by: PHYSICAL THERAPIST

## 2022-01-21 PROCEDURE — 97140 MANUAL THERAPY 1/> REGIONS: CPT | Performed by: PHYSICAL THERAPIST

## 2022-01-21 NOTE — PROGRESS NOTES
Physical Therapy Daily Progress Note    VISIT#: 3    Subjective   Sourav Proctor reports that he had a little bit of pain on the L side of his neck but when he corrected his posture it improved.   Pain Ratin    Objective     See Exercise, Manual, and Modality Logs for complete treatment.     Patient Education: HEP, POC    Assessment/Plan   Pt was 20 mins late to session, treatment time was adjusted accordingly. Pt had mild tenderness with suboccipital release but reported improvement in symptoms afterwards. Pt could possibly benefit from thoracic mobs and STM for improved mobility. Initiated rotational snags today, pt tolerated well and added to HEP.     Progress per Plan of Care and Progress strengthening /stabilization /functional activity          Timed:         Manual Therapy:    14     mins  51116;     Therapeutic Exercise:    12     mins  04351;     Neuromuscular Margot:        mins  25621;    Therapeutic Activity:          mins  67994;     Gait Training:           mins  84292;     Ultrasound:          mins  63058;    Ionto                                   mins   71122  Self Care                            mins   58008  Canalith Repos                   mins  07254    Un-Timed:  Electrical Stimulation:         mins  86933 ( );  Dry Needling          mins self-pay  Traction          mins 37503  Low Eval          Mins  22768  Mod Eval          Mins  40735  High Eval                            Mins  52660  Re-Eval                               mins  94713    Timed Treatment:   26   mins   Total Treatment:     26   mins          Pallavi Olivier  Physical Therapist Assistant  [place] License # [id number]

## 2022-01-24 ENCOUNTER — TREATMENT (OUTPATIENT)
Dept: PHYSICAL THERAPY | Facility: CLINIC | Age: 67
End: 2022-01-24

## 2022-01-24 DIAGNOSIS — M54.2 CERVICALGIA: Primary | ICD-10-CM

## 2022-01-24 PROCEDURE — 97140 MANUAL THERAPY 1/> REGIONS: CPT | Performed by: PHYSICAL THERAPIST

## 2022-01-24 PROCEDURE — 97110 THERAPEUTIC EXERCISES: CPT | Performed by: PHYSICAL THERAPIST

## 2022-01-26 ENCOUNTER — TREATMENT (OUTPATIENT)
Dept: PHYSICAL THERAPY | Facility: CLINIC | Age: 67
End: 2022-01-26

## 2022-01-26 DIAGNOSIS — M54.2 CERVICALGIA: Primary | ICD-10-CM

## 2022-01-26 PROCEDURE — 97140 MANUAL THERAPY 1/> REGIONS: CPT | Performed by: PHYSICAL THERAPIST

## 2022-01-26 PROCEDURE — 97110 THERAPEUTIC EXERCISES: CPT | Performed by: PHYSICAL THERAPIST

## 2022-01-26 NOTE — PROGRESS NOTES
Physical Therapy Daily Progress Note      Patient: Sourav Proctor   : 1955  Diagnosis/ICD-10 Code:  Cervicalgia [M54.2]  Referring practitioner: Celestine Payne PA-C  Date of Initial Visit: Type: THERAPY  Noted: 1/10/2022  Today's Date: 2022  Patient seen for 5 sessions         Sourav Proctor reports:  Soreness after doing exercises last night in B upper trap.  Felt slight soreness after last visit.     Objective   See Exercise, Manual, and Modality Logs for complete treatment.       Assessment/Plan     Patient requires tactile cueing for proper technique with scapular retraction.  Tolerates manual therapy well.  Noted hypomobility in left OA joint, as well as in thoracic spine.  Feeling better at end of session.     Progress per Plan of Care           Manual Therapy:    25     mins  90637;  Therapeutic Exercise:    10     mins  85218;     Neuromuscular Margot:        mins  92371;    Therapeutic Activity:          mins  37603;     Gait Training:           mins  59695;     Ultrasound:          mins  65784;    Electrical Stimulation:         mins  88357 ( );  Dry Needling          mins self-pay    Timed Treatment:   35   mins   Total Treatment:     35   mins    Jasvir Queen PT  Physical Therapist

## 2022-02-01 ENCOUNTER — TREATMENT (OUTPATIENT)
Dept: PHYSICAL THERAPY | Facility: CLINIC | Age: 67
End: 2022-02-01

## 2022-02-01 DIAGNOSIS — M54.2 CERVICALGIA: Primary | ICD-10-CM

## 2022-02-01 PROCEDURE — 97140 MANUAL THERAPY 1/> REGIONS: CPT | Performed by: PHYSICAL THERAPIST

## 2022-02-01 NOTE — PROGRESS NOTES
Physical Therapy Daily Progress Note    Patient: Sourav Proctor   : 1955  Diagnosis/ICD-10 Code:  Cervicalgia [M54.2]  Referring practitioner: Celestine Payne PA-C  Date of Initial Visit: Type: THERAPY  Noted: 1/10/2022  Today's Date: 2022  Patient seen for 6 sessions         Sourav Proctor reports:  Cervical spine feeling better, however still stiff when looking left.  Reports compliance with HEP.    Objective   See Exercise, Manual, and Modality Logs for complete treatment.       Assessment/Plan     Tolerates manual therapy well.  Noted improved cervical left rotation after MET.  Progressing well.     Progress per Plan of Care           Manual Therapy:    25     mins  87516;  Therapeutic Exercise:    5     mins  17045;     Neuromuscular Margot:        mins  57109;    Therapeutic Activity:          mins  36372;     Gait Training:           mins  18270;     Ultrasound:          mins  42277;    Electrical Stimulation:         mins  27422 ( );  Dry Needling          mins self-pay    Timed Treatment:   30   mins   Total Treatment:     30   mins    Jasvir Queen PT  Physical Therapist

## 2022-02-08 ENCOUNTER — TREATMENT (OUTPATIENT)
Dept: PHYSICAL THERAPY | Facility: CLINIC | Age: 67
End: 2022-02-08

## 2022-02-08 DIAGNOSIS — M54.2 CERVICALGIA: Primary | ICD-10-CM

## 2022-02-08 PROCEDURE — 97140 MANUAL THERAPY 1/> REGIONS: CPT | Performed by: PHYSICAL THERAPIST

## 2022-02-08 PROCEDURE — 97110 THERAPEUTIC EXERCISES: CPT | Performed by: PHYSICAL THERAPIST

## 2022-02-08 NOTE — PROGRESS NOTES
Physical Therapy Daily Progress Note      Patient: Sourav Proctor   : 1955  Diagnosis/ICD-10 Code:  Cervicalgia [M54.2]  Referring practitioner: Celestine Payne PA-C  Date of Initial Visit: Type: THERAPY  Noted: 1/10/2022  Today's Date: 2022  Patient seen for 7 sessions         Sourav Proctor reports:  Cervical spine improving slightly.  Reports compliance with HEP.     Objective   See Exercise, Manual, and Modality Logs for complete treatment.       Assessment/Plan     Noted continued limitation with right sidebending and right rotation.  Cervical spine moving better passively and actively at end of visit.  Tolerates added exercise/stretching well.     Progress per Plan of Care           Manual Therapy:    30     mins  78436;  Therapeutic Exercise:    10     mins  34720;     Neuromuscular Margot:        mins  18019;    Therapeutic Activity:          mins  76516;     Gait Training:           mins  65685;     Ultrasound:          mins  42085;    Electrical Stimulation:         mins  84423 (MC );  Dry Needling          mins self-pay    Timed Treatment:   40   mins   Total Treatment:     40   mins    Jasvir Queen PT  Physical Therapist

## 2022-02-10 ENCOUNTER — TREATMENT (OUTPATIENT)
Dept: PHYSICAL THERAPY | Facility: CLINIC | Age: 67
End: 2022-02-10

## 2022-02-10 DIAGNOSIS — M54.2 CERVICALGIA: Primary | ICD-10-CM

## 2022-02-10 PROCEDURE — 97110 THERAPEUTIC EXERCISES: CPT | Performed by: PHYSICAL THERAPIST

## 2022-02-10 PROCEDURE — 97140 MANUAL THERAPY 1/> REGIONS: CPT | Performed by: PHYSICAL THERAPIST

## 2022-02-10 NOTE — PROGRESS NOTES
Physical Therapy Daily Progress Note      Patient: Sourav Proctor   : 1955  Diagnosis/ICD-10 Code:  Cervicalgia [M54.2]  Referring practitioner: Celestine Payne PA-C  Date of Initial Visit: Type: THERAPY  Noted: 1/10/2022  Today's Date: 2/10/2022  Patient seen for 8 sessions             Subjective Pt says that he felt worked over after last visit, a little more sore , but overall tolerated treatment well.    Objective   See Exercise, Manual, and Modality Logs for complete treatment.       Assessment/Plan  Responds well to manual techniques, especially suboccipital release and prone PA mobs to upper to mid T-spine.    Progress per Plan of Care           Timed:         Manual Therapy:    30     mins  14386;     Therapeutic Exercise:    10     mins  09000;         Timed Treatment:   40   mins   Total Treatment:     40   mins        Dani Gonzales PTA  Physical Therapist Assistant

## 2022-02-15 ENCOUNTER — TREATMENT (OUTPATIENT)
Dept: PHYSICAL THERAPY | Facility: CLINIC | Age: 67
End: 2022-02-15

## 2022-02-15 DIAGNOSIS — M54.2 CERVICALGIA: Primary | ICD-10-CM

## 2022-02-15 PROCEDURE — 97140 MANUAL THERAPY 1/> REGIONS: CPT | Performed by: PHYSICAL THERAPIST

## 2022-02-15 PROCEDURE — 97110 THERAPEUTIC EXERCISES: CPT | Performed by: PHYSICAL THERAPIST

## 2022-02-16 NOTE — PROGRESS NOTES
Physical Therapy Daily Progress Note    Patient: Sourav Proctor   : 1955  Diagnosis/ICD-10 Code:  Cervicalgia [M54.2]  Referring practitioner: Celestine Payne PA-C  Date of Initial Visit: Type: THERAPY  Noted: 1/10/2022  Today's Date: 2022  Patient seen for 9 sessions         Sourav Proctor reports:  Less pain with work activities, however stiffness still present at times.  Compliant with HEP.    Objective   See Exercise, Manual, and Modality Logs for complete treatment.       Assessment/Plan     Tolerates manual therapy to cervical spine well.  Reviewed home exercises and postural awareness.  Progressing well.     1x/week next 2 weeks then progress to I HEP    Progress per Plan of Care           Manual Therapy:    30     mins  64715;  Therapeutic Exercise:    8     mins  16225;     Neuromuscular Margot:        mins  47956;    Therapeutic Activity:          mins  70750;     Gait Training:           mins  96677;     Ultrasound:          mins  14407;    Electrical Stimulation:         mins  97933 (MC );  Dry Needling          mins self-pay    Timed Treatment:   38   mins   Total Treatment:     38   mins    Jasvir Queen PT  Physical Therapist

## 2022-02-22 ENCOUNTER — TREATMENT (OUTPATIENT)
Dept: PHYSICAL THERAPY | Facility: CLINIC | Age: 67
End: 2022-02-22

## 2022-02-22 DIAGNOSIS — M54.2 CERVICALGIA: Primary | ICD-10-CM

## 2022-02-22 PROCEDURE — 97140 MANUAL THERAPY 1/> REGIONS: CPT | Performed by: PHYSICAL THERAPIST

## 2022-02-22 NOTE — PROGRESS NOTES
Re-Assessment / Re-Certification    Patient: Sourav Proctor   : 1955  Diagnosis/ICD-10 Code:  Cervicalgia [M54.2]  Referring practitioner: Celestine Payne PA-C  Date of Initial Visit: Type: THERAPY  Noted: 1/10/2022  Today's Date: 2022  Patient seen for 10 sessions      Subjective:   Sourav Proctor reports: Cervical spine seems to be moving better, however still having discomfort on right side of cervical spine.   Clinical Progress: improved  Home Program Compliance: Yes  Treatment has included: therapeutic exercise and manual therapy      Objective          Active Range of Motion   Cervical/Thoracic Spine   Cervical    Flexion: 30 degrees   Extension: 45 degrees   Left lateral flexion: 35 degrees   Right lateral flexion: 35 degrees with pain  Left rotation: 55 degrees   Right rotation: 50 degrees       Assessment/Plan  Progress toward previous goals: Partially Met    New Goals  Short-term goals (STG): In two weeks, patient will demonstrate at least 60 degrees of cervical rotation bilaterally.   Long-term goals (LTG): In four weeks, patient will report completing work activities without pain level over 1/10.     Due to patient response to treatment, patient would benefit from home cervical traction unit.       Recommendations: Continue as planned  Timeframe: 1 month  Prognosis to achieve goals: good    PT Signature: Jasvir Queen PT      Based upon review of the patient's progress and continued therapy plan, it is my medical opinion that Sourav Proctor should continue physical therapy treatment at [facility]  [address] [place] [address] [address] [place] [id number] [phone].    Signature: __________________________________  Celestine Payne PA-C    Manual Therapy:    38     mins  71399;  Therapeutic Exercise:    3     mins  79204;     Neuromuscular Margot:        mins  53694;    Therapeutic Activity:          mins  54349;     Gait Training:           mins  82140;      Ultrasound:          mins  19469;    Electrical Stimulation:         mins  56254 ( );  Dry Needling          mins self-pay    Timed Treatment:   41   mins   Total Treatment:     41   mins

## 2022-03-08 ENCOUNTER — TREATMENT (OUTPATIENT)
Dept: PHYSICAL THERAPY | Facility: CLINIC | Age: 67
End: 2022-03-08

## 2022-03-08 DIAGNOSIS — M54.2 CERVICALGIA: Primary | ICD-10-CM

## 2022-03-08 PROCEDURE — 97140 MANUAL THERAPY 1/> REGIONS: CPT | Performed by: PHYSICAL THERAPIST

## 2022-03-08 NOTE — PROGRESS NOTES
Physical Therapy Daily Progress Note    Patient: Sourav Proctor   : 1955  Diagnosis/ICD-10 Code:  Cervicalgia [M54.2]  Referring practitioner: Celestine Payne PA-C  Date of Initial Visit: Type: THERAPY  Noted: 1/10/2022  Today's Date: 3/8/2022  Patient seen for 11 sessions         Sourav Proctor reports:  Feels mobility remains improved, however still pain at end range with rotation and sidebending.       Objective   See Exercise, Manual, and Modality Logs for complete treatment.       Assessment/Plan     Tolerates manual therapy well this visit.  Will continue with I HEP and home traction unit.     Progress per Plan of Care           Manual Therapy:    40     mins  93325;  Therapeutic Exercise:         mins  26367;     Neuromuscular Margot:        mins  18767;    Therapeutic Activity:          mins  14901;     Gait Training:           mins  47141;     Ultrasound:          mins  26519;    Electrical Stimulation:         mins  26989 ( );  Dry Needling          mins self-pay    Timed Treatment:   40   mins   Total Treatment:     40   mins    Jasvir Queen PT  Physical Therapist

## 2022-03-25 DIAGNOSIS — K21.9 GASTROESOPHAGEAL REFLUX DISEASE WITHOUT ESOPHAGITIS: ICD-10-CM

## 2022-03-25 RX ORDER — OMEPRAZOLE 40 MG/1
40 CAPSULE, DELAYED RELEASE ORAL DAILY
Qty: 90 CAPSULE | Refills: 3 | Status: SHIPPED | OUTPATIENT
Start: 2022-03-25 | End: 2022-11-28 | Stop reason: SDUPTHER

## 2022-04-12 ENCOUNTER — OFFICE VISIT (OUTPATIENT)
Dept: SLEEP MEDICINE | Facility: CLINIC | Age: 67
End: 2022-04-12

## 2022-04-12 VITALS
DIASTOLIC BLOOD PRESSURE: 73 MMHG | WEIGHT: 170 LBS | OXYGEN SATURATION: 99 % | SYSTOLIC BLOOD PRESSURE: 115 MMHG | BODY MASS INDEX: 26.68 KG/M2 | HEART RATE: 74 BPM | HEIGHT: 67 IN

## 2022-04-12 DIAGNOSIS — Z99.89 OSA ON CPAP: Primary | ICD-10-CM

## 2022-04-12 DIAGNOSIS — G47.33 OSA ON CPAP: Primary | ICD-10-CM

## 2022-04-12 PROCEDURE — 99213 OFFICE O/P EST LOW 20 MIN: CPT | Performed by: INTERNAL MEDICINE

## 2022-04-12 PROCEDURE — G0463 HOSPITAL OUTPT CLINIC VISIT: HCPCS

## 2022-04-12 NOTE — PROGRESS NOTES
"  [address] [address]  Phone   Fax       SLEEP CLINIC FOLLOW UP PROGRESS NOTE.    Sourav Proctor  [id number]   1955  66 y.o.  male      PCP: Celestine Payne PA-C      Date of visit: 4/12/2022    Chief Complaint   Patient presents with   • Sleep Apnea       HPI:  This is a 66 y.o. years old patient is here for the management of obstructive sleep apnea.  Sleep apnea is mild to moderate in severity with a AHI of 6/h and in supine position 15//hr. Patient is using positive airway pressure therapy with auto CPAP and the symptoms of snoring, non-restorative sleep and daytime excessive sleepiness have improved significantly on the therapy. Normally goes to bed at 10 PM and wakes up at 5 AM.  The patient wakes up 0 time(s) during the night and has no problem going back to sleep.  Feels refreshed after waking up.  Patient also denies headaches and nasal congestion.  Patient started taking allergy shots last year and his symptoms of much improved    Medications and allergies are reviewed by me and documented in the encounter.     SOCIAL (habits pertaining to sleep medicine)  History tobacco use:No   History of alcohol use: 4 per week  Caffeine use: 3     REVIEW OF SYSTEMS:   [place] Sleepiness Scale :Total score: 11   Nasal congestion:Yes   Dry mouth/nose:No   Post nasal drip; No   Acid reflux/Heartburn:No   Abd bloating:No   Morning headache:No   Anxiety:No   Depression:No    PHYSICAL EXAMINATION:  CONSTITUTIONAL:  Vitals:    04/12/22 1512   BP: 115/73   BP Location: Left arm   Patient Position: Sitting   Cuff Size: Adult   Pulse: 74   SpO2: 99%   Weight: 77.1 kg (170 lb)   Height: 170.2 cm (67\")    Body mass index is 26.63 kg/m².   NOSE: nasal passages are clear, No deformities noted   RESP SYSTEM: Not in any respiratory distress, no chest deformities noted,   CARDIOVASULAR: No edema noted  NEURO: Oriented x 3, gait normal,  Mood and affect " appeared appropriate      Data reviewed:  The Smart card downloaded on 4/12/2022 has been reviewed independently by me for compliance and discussed the data with the patient.   Compliance; 100%  More than 4 hr use, 70%  Average use of the device 5 hours and 22 minutes per night  Residual AHI: 4.4 /hr (goal < 5.0 /hr)  Mask type: Full facemask  Device: Demonstration 2  DME: [facility]      ASSESSMENT AND PLAN:  · Obstructive sleep apnea ( G 47.33).  The symptoms of sleep apnea have improved with the device and the treatment.  Patient's compliance with the device is excellent for treatment of sleep apnea.  I have independently reviewed the smart card down load and discussed with the patient the download data and encouarged the patient to continue to use the device.The residual AHI is acceptable. The device is benefiting the patient and the device is medically necessary.  Without proper control of sleep apnea and good compliance there is a increased risk for hypertension, diabetes mellitus and nonrestorative sleep with hypersomnia which can increase risk for motor vehicle accidents.  Untreated sleep apnea is also a risk factor for development of atrial fibrillation, pulmonary hypertension and stroke. The patient is also instructed to get the supplies from the [employer] company and and change them on a regular basis.  A prescription for supplies has been sent to the DME company.  I have also discussed the good sleep hygiene habits and adequate amount of sleep needed for good health.  · Return in about 1 year (around 4/12/2023) for Annual visit with smartcard download. . Patient's questions were answered.      Anastasia Handy MD  Sleep Medicine.  Medical Director, [facility] sleep [facility]  4/12/2022 ,

## 2022-05-05 DIAGNOSIS — Z12.5 SCREENING FOR PROSTATE CANCER: ICD-10-CM

## 2022-05-05 DIAGNOSIS — Z00.00 ANNUAL PHYSICAL EXAM: ICD-10-CM

## 2022-05-05 DIAGNOSIS — E78.2 MIXED HYPERLIPIDEMIA: Primary | ICD-10-CM

## 2022-05-26 ENCOUNTER — LAB (OUTPATIENT)
Dept: FAMILY MEDICINE CLINIC | Facility: CLINIC | Age: 67
End: 2022-05-26

## 2022-05-26 DIAGNOSIS — Z12.5 SCREENING FOR PROSTATE CANCER: ICD-10-CM

## 2022-05-26 DIAGNOSIS — Z00.00 ANNUAL PHYSICAL EXAM: ICD-10-CM

## 2022-05-26 DIAGNOSIS — E78.2 MIXED HYPERLIPIDEMIA: ICD-10-CM

## 2022-05-26 LAB
25(OH)D3 SERPL-MCNC: 29.7 NG/ML (ref 30–100)
ALBUMIN SERPL-MCNC: 4.5 G/DL (ref 3.5–5.2)
ALBUMIN/GLOB SERPL: 1.9 G/DL
ALP SERPL-CCNC: 62 U/L (ref 39–117)
ALT SERPL W P-5'-P-CCNC: 26 U/L (ref 1–41)
ANION GAP SERPL CALCULATED.3IONS-SCNC: 10.6 MMOL/L (ref 5–15)
AST SERPL-CCNC: 21 U/L (ref 1–40)
BASOPHILS # BLD AUTO: 0 10*3/MM3 (ref 0–0.2)
BASOPHILS NFR BLD AUTO: 0.6 % (ref 0–1.5)
BILIRUB SERPL-MCNC: 0.4 MG/DL (ref 0–1.2)
BUN SERPL-MCNC: 16 MG/DL (ref 8–23)
BUN/CREAT SERPL: 17.2 (ref 7–25)
CALCIUM SPEC-SCNC: 9.8 MG/DL (ref 8.6–10.5)
CHLORIDE SERPL-SCNC: 101 MMOL/L (ref 98–107)
CHOLEST SERPL-MCNC: 180 MG/DL (ref 0–200)
CO2 SERPL-SCNC: 26.4 MMOL/L (ref 22–29)
CREAT SERPL-MCNC: 0.93 MG/DL (ref 0.76–1.27)
DEPRECATED RDW RBC AUTO: 44.6 FL (ref 37–54)
EGFRCR SERPLBLD CKD-EPI 2021: 91.1 ML/MIN/1.73
EOSINOPHIL # BLD AUTO: 0.1 10*3/MM3 (ref 0–0.4)
EOSINOPHIL NFR BLD AUTO: 1.7 % (ref 0.3–6.2)
ERYTHROCYTE [DISTWIDTH] IN BLOOD BY AUTOMATED COUNT: 14.2 % (ref 12.3–15.4)
GLOBULIN UR ELPH-MCNC: 2.4 GM/DL
GLUCOSE SERPL-MCNC: 95 MG/DL (ref 65–99)
HCT VFR BLD AUTO: 39.1 % (ref 37.5–51)
HDLC SERPL-MCNC: 44 MG/DL (ref 40–60)
HGB BLD-MCNC: 13.4 G/DL (ref 13–17.7)
LDLC SERPL CALC-MCNC: 113 MG/DL (ref 0–100)
LDLC/HDLC SERPL: 2.5 {RATIO}
LYMPHOCYTES # BLD AUTO: 1.5 10*3/MM3 (ref 0.7–3.1)
LYMPHOCYTES NFR BLD AUTO: 24.1 % (ref 19.6–45.3)
MCH RBC QN AUTO: 30.5 PG (ref 26.6–33)
MCHC RBC AUTO-ENTMCNC: 34.2 G/DL (ref 31.5–35.7)
MCV RBC AUTO: 89.2 FL (ref 79–97)
MONOCYTES # BLD AUTO: 0.6 10*3/MM3 (ref 0.1–0.9)
MONOCYTES NFR BLD AUTO: 9.8 % (ref 5–12)
NEUTROPHILS NFR BLD AUTO: 4 10*3/MM3 (ref 1.7–7)
NEUTROPHILS NFR BLD AUTO: 63.8 % (ref 42.7–76)
NRBC BLD AUTO-RTO: 0.1 /100 WBC (ref 0–0.2)
PLATELET # BLD AUTO: 249 10*3/MM3 (ref 140–450)
PMV BLD AUTO: 8.1 FL (ref 6–12)
POTASSIUM SERPL-SCNC: 4.1 MMOL/L (ref 3.5–5.2)
PROT SERPL-MCNC: 6.9 G/DL (ref 6–8.5)
PSA SERPL-MCNC: 1.6 NG/ML (ref 0–4)
RBC # BLD AUTO: 4.38 10*6/MM3 (ref 4.14–5.8)
SODIUM SERPL-SCNC: 138 MMOL/L (ref 136–145)
TRIGL SERPL-MCNC: 130 MG/DL (ref 0–150)
TSH SERPL DL<=0.05 MIU/L-ACNC: 1.63 UIU/ML (ref 0.27–4.2)
VIT B12 BLD-MCNC: 473 PG/ML (ref 211–946)
VLDLC SERPL-MCNC: 23 MG/DL (ref 5–40)
WBC NRBC COR # BLD: 6.3 10*3/MM3 (ref 3.4–10.8)

## 2022-05-26 PROCEDURE — 80050 GENERAL HEALTH PANEL: CPT | Performed by: PHYSICIAN ASSISTANT

## 2022-05-26 PROCEDURE — G0103 PSA SCREENING: HCPCS | Performed by: PHYSICIAN ASSISTANT

## 2022-05-26 PROCEDURE — 36415 COLL VENOUS BLD VENIPUNCTURE: CPT

## 2022-05-26 PROCEDURE — 82607 VITAMIN B-12: CPT | Performed by: PHYSICIAN ASSISTANT

## 2022-05-26 PROCEDURE — 80061 LIPID PANEL: CPT | Performed by: PHYSICIAN ASSISTANT

## 2022-05-26 PROCEDURE — 82306 VITAMIN D 25 HYDROXY: CPT | Performed by: PHYSICIAN ASSISTANT

## 2022-05-31 NOTE — PROGRESS NOTES
Left detailed voicemail for Sourav Proctor as per verbal release. Advised Sourav Proctor to call the office with any additional questions.

## 2022-06-02 ENCOUNTER — OFFICE VISIT (OUTPATIENT)
Dept: FAMILY MEDICINE CLINIC | Facility: CLINIC | Age: 67
End: 2022-06-02

## 2022-06-02 VITALS
TEMPERATURE: 97.5 F | HEART RATE: 85 BPM | RESPIRATION RATE: 16 BRPM | DIASTOLIC BLOOD PRESSURE: 83 MMHG | SYSTOLIC BLOOD PRESSURE: 130 MMHG | WEIGHT: 173.2 LBS | BODY MASS INDEX: 27.18 KG/M2 | HEIGHT: 67 IN | OXYGEN SATURATION: 99 %

## 2022-06-02 DIAGNOSIS — E78.2 MIXED HYPERLIPIDEMIA: Primary | ICD-10-CM

## 2022-06-02 DIAGNOSIS — E55.9 VITAMIN D DEFICIENCY: ICD-10-CM

## 2022-06-02 PROCEDURE — 99213 OFFICE O/P EST LOW 20 MIN: CPT | Performed by: PHYSICIAN ASSISTANT

## 2022-06-02 NOTE — PROGRESS NOTES
"Subjective   Sourav Proctor is a 66 y.o. male.     Chief Complaint   Patient presents with   • Hyperlipidemia       /83 (BP Location: Left arm, Patient Position: Sitting, Cuff Size: Adult)   Pulse 85   Temp 97.5 °F (36.4 °C) (Infrared)   Resp 16   Ht 170.2 cm (67\")   Wt 78.6 kg (173 lb 3.2 oz)   SpO2 99%   BMI 27.13 kg/m²     BP Readings from Last 3 Encounters:   06/02/22 130/83   04/12/22 115/73   12/16/21 133/73       Wt Readings from Last 3 Encounters:   06/02/22 78.6 kg (173 lb 3.2 oz)   04/12/22 77.1 kg (170 lb)   12/16/21 78.6 kg (173 lb 3.2 oz)       HPI Presents to the clinic for follow up on hld. Had labs done prior to the visit and cholesterol was stable and improved. Vit d slightly low. Takes multivitamin. Going to [place] upcoming. psa low but has come up some so we are trending. Is going to start therapy at work.     The following portions of the patient's history were reviewed and updated as appropriate: allergies, current medications, past family history, past medical history, past social history, past surgical history and problem list.    Review of Systems    Objective   Physical Exam  Constitutional:       Appearance: He is well-developed.   Eyes:      Conjunctiva/sclera: Conjunctivae normal.      Pupils: Pupils are equal, round, and reactive to light.   Cardiovascular:      Rate and Rhythm: Normal rate and regular rhythm.      Heart sounds: No murmur heard.  Pulmonary:      Effort: Pulmonary effort is normal.      Breath sounds: Normal breath sounds.   Abdominal:      General: Bowel sounds are normal.      Palpations: Abdomen is soft.      Tenderness: There is no abdominal tenderness.   Musculoskeletal:         General: No deformity. Normal range of motion.      Cervical back: Normal range of motion and neck supple.   Lymphadenopathy:      Cervical: No cervical adenopathy.   Skin:     General: Skin is warm and dry.      Findings: No rash.   Neurological:      Mental Status: He is alert " and oriented to person, place, and time.      Cranial Nerves: No cranial nerve deficit.   Psychiatric:         Behavior: Behavior normal.         Thought Content: Thought content normal.         Judgment: Judgment normal.           Diagnoses and all orders for this visit:    1. Mixed hyperlipidemia (Primary)    2. Vitamin D deficiency    everything looks good. Take otc vit d as discussed. I think it is a good idea for him to go ahead with counseling at work.     Return if symptoms worsen or fail to improve.

## 2022-09-10 PROCEDURE — U0004 COV-19 TEST NON-CDC HGH THRU: HCPCS | Performed by: FAMILY MEDICINE

## 2022-09-13 ENCOUNTER — TELEMEDICINE (OUTPATIENT)
Dept: FAMILY MEDICINE CLINIC | Facility: CLINIC | Age: 67
End: 2022-09-13

## 2022-09-13 DIAGNOSIS — U07.1 COVID-19: Primary | ICD-10-CM

## 2022-09-13 PROCEDURE — 99442 PR PHYS/QHP TELEPHONE EVALUATION 11-20 MIN: CPT | Performed by: PHYSICIAN ASSISTANT

## 2022-09-13 NOTE — PROGRESS NOTES
Subjective   Sourav Proctor is a 66 y.o. male.     No chief complaint on file.      There were no vitals taken for this visit.    BP Readings from Last 3 Encounters:   09/10/22 120/76   06/02/22 130/83   04/12/22 115/73       Wt Readings from Last 3 Encounters:   09/10/22 77.1 kg (170 lb)   06/02/22 78.6 kg (173 lb 3.2 oz)   04/12/22 77.1 kg (170 lb)       HAIDER Presents to the clinic via [employer] video visit for covid 19. He was seen in the urgent care on 9/10/22 and tested positives. His symptoms started Friday of last week. He took his first home test 9/9/22. He has been improving. No soa or fever at this point.     The following portions of the patient's history were reviewed and updated as appropriate: allergies, current medications, past family history, past medical history, past social history, past surgical history and problem list.    Review of Systems    Objective   Physical Exam  Unable to perform full physical exam due to telehealth visit.     You have chosen to receive care through a telephone visit. Do you consent to use a telephone visit for your medical care today? Yes      Diagnoses and all orders for this visit:    1. COVID-19 (Primary)    continue supportive care. 20 minutes spent on phone conversation, chart review, and charting.     Return if symptoms worsen or fail to improve.

## 2022-09-20 PROBLEM — U07.1 COVID-19: Status: ACTIVE | Noted: 2022-09-20

## 2022-11-28 DIAGNOSIS — E78.2 MIXED HYPERLIPIDEMIA: ICD-10-CM

## 2022-11-28 DIAGNOSIS — K21.9 GASTROESOPHAGEAL REFLUX DISEASE WITHOUT ESOPHAGITIS: ICD-10-CM

## 2022-11-28 RX ORDER — OMEPRAZOLE 40 MG/1
40 CAPSULE, DELAYED RELEASE ORAL DAILY
Qty: 90 CAPSULE | Refills: 3 | Status: SHIPPED | OUTPATIENT
Start: 2022-11-28 | End: 2022-11-28 | Stop reason: SDUPTHER

## 2022-11-28 RX ORDER — SIMVASTATIN 10 MG
10 TABLET ORAL NIGHTLY
Qty: 90 TABLET | Refills: 3 | Status: SHIPPED | OUTPATIENT
Start: 2022-11-28 | End: 2022-11-28 | Stop reason: SDUPTHER

## 2022-11-28 RX ORDER — SIMVASTATIN 10 MG
10 TABLET ORAL NIGHTLY
Qty: 90 TABLET | Refills: 3 | Status: SHIPPED | OUTPATIENT
Start: 2022-11-28

## 2022-11-28 RX ORDER — OMEPRAZOLE 40 MG/1
40 CAPSULE, DELAYED RELEASE ORAL DAILY
Qty: 90 CAPSULE | Refills: 3 | Status: SHIPPED | OUTPATIENT
Start: 2022-11-28

## 2023-04-18 ENCOUNTER — OFFICE VISIT (OUTPATIENT)
Dept: SLEEP MEDICINE | Facility: CLINIC | Age: 68
End: 2023-04-18
Payer: COMMERCIAL

## 2023-04-18 VITALS
OXYGEN SATURATION: 98 % | WEIGHT: 172 LBS | BODY MASS INDEX: 27 KG/M2 | SYSTOLIC BLOOD PRESSURE: 116 MMHG | DIASTOLIC BLOOD PRESSURE: 74 MMHG | HEART RATE: 76 BPM | HEIGHT: 67 IN

## 2023-04-18 DIAGNOSIS — Z99.89 OSA ON CPAP: Primary | ICD-10-CM

## 2023-04-18 DIAGNOSIS — G47.33 OSA ON CPAP: Primary | ICD-10-CM

## 2023-04-18 NOTE — PROGRESS NOTES
"  [address] [address]  Phone   Fax       SLEEP CLINIC FOLLOW UP PROGRESS NOTE.    Sourav Proctor  [id number]   1955  67 y.o.  male      PCP: Celestine Payne PA-C      Date of visit: 4/18/2023    Chief Complaint   Patient presents with   • 1 YR F/U   • Sleep Apnea       HPI:  This is a 67 y.o. years old patient is here for the management of obstructive sleep apnea.  Sleep apnea is mild to moderate in severity with a AHI of 6/h and in supine position 15//hr. Patient is using positive airway pressure therapy with auto CPAP and the symptoms of snoring, non-restorative sleep and daytime excessive sleepiness have improved significantly on the therapy. Normally goes to bed at 10 PM and wakes up at 5 AM.  The patient wakes up 0 time(s) during the night and has no problem going back to sleep.  Feels refreshed after waking up.  Patient also denies headaches and nasal congestion.  Patient started taking allergy shots last year and his symptoms of much improved.  He is planning to retire at the end of this year is a     Medications and allergies are reviewed by me and documented in the encounter.     SOCIAL (habits pertaining to sleep medicine)  History tobacco use:No   History of alcohol use: 4 per week  Caffeine use: 3     REVIEW OF SYSTEMS:   [place] Sleepiness Scale :Total score: 10   Nasal congestion:Yes   Dry mouth/nose:No   Post nasal drip; No   Acid reflux/Heartburn:No   Abd bloating:No   Morning headache:No   Anxiety:No   Depression:No    PHYSICAL EXAMINATION:  CONSTITUTIONAL:  Vitals:    04/18/23 1143   BP: 116/74   BP Location: Left arm   Patient Position: Sitting   Cuff Size: Adult   Pulse: 76   SpO2: 98%   Weight: 78 kg (172 lb)   Height: 170.2 cm (67\")    Body mass index is 26.94 kg/m².   NOSE: nasal passages are clear, No deformities noted   RESP SYSTEM: Not in any respiratory distress, no chest deformities noted, "   CARDIOVASULAR: No edema noted  NEURO: Oriented x 3, gait normal,  Mood and affect appeared appropriate      Data reviewed:  The Smart card downloaded on 4/18/2023 has been reviewed independently by me for compliance and discussed the data with the patient.   Compliance; 100%  More than 4 hr use, 70%  Average use of the device 5 hours and 22 minutes per night  Residual AHI: 5 /hr (goal < 5.0 /hr)  Mask type: Full facemask  Device: Demonstration 2  DME: [facility]      ASSESSMENT AND PLAN:  · Obstructive sleep apnea ( G 47.33).  The symptoms of sleep apnea have improved with the device and the treatment.  Patient's compliance with the device is excellent for treatment of sleep apnea.  I have independently reviewed the smart card down load and discussed with the patient the download data and encouarged the patient to continue to use the device.The residual AHI is acceptable. The device is benefiting the patient and the device is medically necessary.  Without proper control of sleep apnea and good compliance there is a increased risk for hypertension, diabetes mellitus and nonrestorative sleep with hypersomnia which can increase risk for motor vehicle accidents.  Untreated sleep apnea is also a risk factor for development of atrial fibrillation, pulmonary hypertension and stroke. The patient is also instructed to get the supplies from the DME company and and change them on a regular basis.  A prescription for supplies has been sent to the [employer] company.  I have also discussed the good sleep hygiene habits and adequate amount of sleep needed for good health.  · Return in about 1 year (around 4/18/2024) for with smart card down load. . Patient's questions were answered.      Anastasia Handy MD  Sleep Medicine.  Medical Director, [facility] sleep [facility]  4/18/2023 ,

## 2023-04-24 DIAGNOSIS — M54.2 CERVICALGIA: ICD-10-CM

## 2023-04-24 DIAGNOSIS — M54.12 CERVICAL RADICULOPATHY: Primary | ICD-10-CM

## 2023-05-15 DIAGNOSIS — Z00.00 ANNUAL PHYSICAL EXAM: ICD-10-CM

## 2023-05-15 DIAGNOSIS — Z12.5 SCREENING FOR PROSTATE CANCER: ICD-10-CM

## 2023-05-15 DIAGNOSIS — E55.9 VITAMIN D DEFICIENCY: Primary | ICD-10-CM

## 2023-05-25 ENCOUNTER — HOSPITAL ENCOUNTER (OUTPATIENT)
Dept: MRI IMAGING | Facility: HOSPITAL | Age: 68
Discharge: HOME | End: 2023-05-25
Admitting: PHYSICIAN ASSISTANT
Payer: COMMERCIAL

## 2023-05-25 DIAGNOSIS — M54.2 CERVICALGIA: ICD-10-CM

## 2023-05-25 DIAGNOSIS — M54.12 CERVICAL RADICULOPATHY: ICD-10-CM

## 2023-05-25 PROCEDURE — 72141 MRI NECK SPINE W/O DYE: CPT

## 2023-05-30 ENCOUNTER — LAB (OUTPATIENT)
Dept: FAMILY MEDICINE CLINIC | Facility: CLINIC | Age: 68
End: 2023-05-30

## 2023-05-30 DIAGNOSIS — Z00.00 ANNUAL PHYSICAL EXAM: ICD-10-CM

## 2023-05-30 DIAGNOSIS — Z12.5 SCREENING FOR PROSTATE CANCER: ICD-10-CM

## 2023-05-30 DIAGNOSIS — E55.9 VITAMIN D DEFICIENCY: ICD-10-CM

## 2023-05-30 LAB
25(OH)D3 SERPL-MCNC: 39.2 NG/ML (ref 30–100)
ALBUMIN SERPL-MCNC: 4.4 G/DL (ref 3.5–5.2)
ALBUMIN/GLOB SERPL: 2 G/DL
ALP SERPL-CCNC: 55 U/L (ref 39–117)
ALT SERPL W P-5'-P-CCNC: 18 U/L (ref 1–41)
ANION GAP SERPL CALCULATED.3IONS-SCNC: 8.9 MMOL/L (ref 5–15)
AST SERPL-CCNC: 19 U/L (ref 1–40)
BASOPHILS # BLD AUTO: 0.02 10*3/MM3 (ref 0–0.2)
BASOPHILS NFR BLD AUTO: 0.4 % (ref 0–1.5)
BILIRUB SERPL-MCNC: 0.6 MG/DL (ref 0–1.2)
BUN SERPL-MCNC: 12 MG/DL (ref 8–23)
BUN/CREAT SERPL: 12.5 (ref 7–25)
CALCIUM SPEC-SCNC: 9.7 MG/DL (ref 8.6–10.5)
CHLORIDE SERPL-SCNC: 105 MMOL/L (ref 98–107)
CHOLEST SERPL-MCNC: 180 MG/DL (ref 0–200)
CO2 SERPL-SCNC: 27.1 MMOL/L (ref 22–29)
CREAT SERPL-MCNC: 0.96 MG/DL (ref 0.76–1.27)
DEPRECATED RDW RBC AUTO: 43.1 FL (ref 37–54)
EGFRCR SERPLBLD CKD-EPI 2021: 87.2 ML/MIN/1.73
EOSINOPHIL # BLD AUTO: 0.09 10*3/MM3 (ref 0–0.4)
EOSINOPHIL NFR BLD AUTO: 1.7 % (ref 0.3–6.2)
ERYTHROCYTE [DISTWIDTH] IN BLOOD BY AUTOMATED COUNT: 12.8 % (ref 12.3–15.4)
GLOBULIN UR ELPH-MCNC: 2.2 GM/DL
GLUCOSE SERPL-MCNC: 100 MG/DL (ref 65–99)
HBA1C MFR BLD: 5.5 % (ref 4.8–5.6)
HCT VFR BLD AUTO: 40.8 % (ref 37.5–51)
HDLC SERPL-MCNC: 56 MG/DL (ref 40–60)
HGB BLD-MCNC: 13.5 G/DL (ref 13–17.7)
IMM GRANULOCYTES # BLD AUTO: 0.01 10*3/MM3 (ref 0–0.05)
IMM GRANULOCYTES NFR BLD AUTO: 0.2 % (ref 0–0.5)
LDLC SERPL CALC-MCNC: 104 MG/DL (ref 0–100)
LDLC/HDLC SERPL: 1.83 {RATIO}
LYMPHOCYTES # BLD AUTO: 1.48 10*3/MM3 (ref 0.7–3.1)
LYMPHOCYTES NFR BLD AUTO: 27.5 % (ref 19.6–45.3)
MCH RBC QN AUTO: 30.8 PG (ref 26.6–33)
MCHC RBC AUTO-ENTMCNC: 33.1 G/DL (ref 31.5–35.7)
MCV RBC AUTO: 92.9 FL (ref 79–97)
MONOCYTES # BLD AUTO: 0.64 10*3/MM3 (ref 0.1–0.9)
MONOCYTES NFR BLD AUTO: 11.9 % (ref 5–12)
NEUTROPHILS NFR BLD AUTO: 3.15 10*3/MM3 (ref 1.7–7)
NEUTROPHILS NFR BLD AUTO: 58.3 % (ref 42.7–76)
NRBC BLD AUTO-RTO: 0 /100 WBC (ref 0–0.2)
PLATELET # BLD AUTO: 240 10*3/MM3 (ref 140–450)
PMV BLD AUTO: 10.6 FL (ref 6–12)
POTASSIUM SERPL-SCNC: 4.1 MMOL/L (ref 3.5–5.2)
PROT SERPL-MCNC: 6.6 G/DL (ref 6–8.5)
PSA SERPL-MCNC: 0.76 NG/ML (ref 0–4)
RBC # BLD AUTO: 4.39 10*6/MM3 (ref 4.14–5.8)
SODIUM SERPL-SCNC: 141 MMOL/L (ref 136–145)
TRIGL SERPL-MCNC: 109 MG/DL (ref 0–150)
TSH SERPL DL<=0.05 MIU/L-ACNC: 1.47 UIU/ML (ref 0.27–4.2)
VLDLC SERPL-MCNC: 20 MG/DL (ref 5–40)
WBC NRBC COR # BLD: 5.39 10*3/MM3 (ref 3.4–10.8)

## 2023-05-30 PROCEDURE — 80061 LIPID PANEL: CPT | Performed by: PHYSICIAN ASSISTANT

## 2023-05-30 PROCEDURE — G0103 PSA SCREENING: HCPCS | Performed by: PHYSICIAN ASSISTANT

## 2023-05-30 PROCEDURE — 80050 GENERAL HEALTH PANEL: CPT | Performed by: PHYSICIAN ASSISTANT

## 2023-05-30 PROCEDURE — 36415 COLL VENOUS BLD VENIPUNCTURE: CPT

## 2023-05-30 PROCEDURE — 83036 HEMOGLOBIN GLYCOSYLATED A1C: CPT | Performed by: PHYSICIAN ASSISTANT

## 2023-05-30 PROCEDURE — 82306 VITAMIN D 25 HYDROXY: CPT | Performed by: PHYSICIAN ASSISTANT

## 2023-06-06 ENCOUNTER — OFFICE VISIT (OUTPATIENT)
Dept: FAMILY MEDICINE CLINIC | Facility: CLINIC | Age: 68
End: 2023-06-06
Payer: COMMERCIAL

## 2023-06-06 VITALS
HEART RATE: 80 BPM | OXYGEN SATURATION: 98 % | HEIGHT: 67 IN | WEIGHT: 166.2 LBS | BODY MASS INDEX: 26.09 KG/M2 | DIASTOLIC BLOOD PRESSURE: 69 MMHG | SYSTOLIC BLOOD PRESSURE: 114 MMHG | TEMPERATURE: 97.7 F | RESPIRATION RATE: 18 BRPM

## 2023-06-06 DIAGNOSIS — M47.812 CERVICAL FACET JOINT SYNDROME: ICD-10-CM

## 2023-06-06 DIAGNOSIS — Z00.00 ANNUAL PHYSICAL EXAM: Primary | ICD-10-CM

## 2023-06-06 DIAGNOSIS — K21.9 GASTROESOPHAGEAL REFLUX DISEASE, UNSPECIFIED WHETHER ESOPHAGITIS PRESENT: ICD-10-CM

## 2023-06-06 DIAGNOSIS — M50.30 DDD (DEGENERATIVE DISC DISEASE), CERVICAL: ICD-10-CM

## 2023-06-06 DIAGNOSIS — E78.2 MIXED HYPERLIPIDEMIA: ICD-10-CM

## 2023-06-06 PROBLEM — U07.1 COVID-19: Status: RESOLVED | Noted: 2022-09-20 | Resolved: 2023-06-06

## 2023-06-06 PROCEDURE — 99397 PER PM REEVAL EST PAT 65+ YR: CPT | Performed by: PHYSICIAN ASSISTANT

## 2023-06-06 RX ORDER — PRENATAL VIT 91/IRON/FOLIC/DHA 28-975-200
1 COMBINATION PACKAGE (EA) ORAL 2 TIMES DAILY
Qty: 42 G | Refills: 1 | Status: SHIPPED | OUTPATIENT
Start: 2023-06-06

## 2023-06-06 NOTE — PROGRESS NOTES
"Subjective   Sourav Proctor is a 67 y.o. male.     Chief Complaint   Patient presents with    Annual Exam     Also follow up on labs , MRI , And also has a spot  on his left arm he feels may be ring worm       /69   Pulse 80   Temp 97.7 °F (36.5 °C) (Infrared)   Resp 18   Ht 170.2 cm (67.01\")   Wt 75.4 kg (166 lb 3.2 oz)   SpO2 98%   BMI 26.02 kg/m²     BP Readings from Last 3 Encounters:   06/06/23 114/69   04/18/23 116/74   09/10/22 120/76       Wt Readings from Last 3 Encounters:   06/06/23 75.4 kg (166 lb 3.2 oz)   05/25/23 77.1 kg (170 lb)   04/18/23 78 kg (172 lb)       HPI Presents to the clinic for annual physical exam. He has been doing well over the past year. He saw audiology and had hearing test. Was found to have sensory hearing loss. He had been having worsening neck pain and we had an mri done about 2 weeks ago. This showed DDD in the cervical spine. He doesn't have pain radiating down the arms. He has a lot of pain when he moves his neck in certain directions.     The following portions of the patient's history were reviewed and updated as appropriate: allergies, current medications, past family history, past medical history, past social history, past surgical history, and problem list.    Review of Systems    Objective   Physical Exam  Constitutional:       Appearance: He is well-developed.   HENT:      Head: Normocephalic and atraumatic.      Right Ear: Tympanic membrane normal.      Left Ear: Tympanic membrane normal.      Nose: No congestion.      Mouth/Throat:      Pharynx: No oropharyngeal exudate.   Eyes:      Conjunctiva/sclera: Conjunctivae normal.      Pupils: Pupils are equal, round, and reactive to light.   Cardiovascular:      Rate and Rhythm: Normal rate and regular rhythm.      Heart sounds: No murmur heard.  Pulmonary:      Effort: Pulmonary effort is normal.      Breath sounds: Normal breath sounds.   Abdominal:      General: Bowel sounds are normal.      Palpations: " Abdomen is soft.      Tenderness: There is no abdominal tenderness.   Musculoskeletal:         General: No deformity. Normal range of motion.      Cervical back: Normal range of motion and neck supple.   Lymphadenopathy:      Cervical: No cervical adenopathy.   Skin:     General: Skin is warm and dry.      Capillary Refill: Capillary refill takes less than 2 seconds.      Findings: No rash.   Neurological:      Mental Status: He is alert and oriented to person, place, and time.      Cranial Nerves: No cranial nerve deficit.   Psychiatric:         Behavior: Behavior normal.         Thought Content: Thought content normal.         Judgment: Judgment normal.         Diagnoses and all orders for this visit:    1. Annual physical exam (Primary)    2. Mixed hyperlipidemia    3. DDD (degenerative disc disease), cervical  -     Ambulatory Referral to Pain Management    4. Gastroesophageal reflux disease, unspecified whether esophagitis present    5. Cervical facet joint syndrome  -     Ambulatory Referral to Pain Management    Other orders  -     terbinafine (lamISIL) 1 % cream; Apply 1 application topically to the appropriate area as directed 2 (Two) Times a Day.  Dispense: 42 g; Refill: 1      Healthy eating habits. Low saturated fats. High plant based. Avoid processed foods. 15-30 min of cardiovascular exercise 5 days per week with atleast 2-3 days of resistance training.     Return in about 1 year (around 6/6/2024), or if symptoms worsen or fail to improve, for Annual physical.

## 2023-06-14 ENCOUNTER — OFFICE VISIT (OUTPATIENT)
Dept: PAIN MEDICINE | Facility: CLINIC | Age: 68
End: 2023-06-14
Payer: COMMERCIAL

## 2023-06-14 VITALS
RESPIRATION RATE: 16 BRPM | OXYGEN SATURATION: 99 % | SYSTOLIC BLOOD PRESSURE: 108 MMHG | HEART RATE: 71 BPM | DIASTOLIC BLOOD PRESSURE: 58 MMHG

## 2023-06-14 DIAGNOSIS — G89.4 CHRONIC PAIN SYNDROME: Primary | ICD-10-CM

## 2023-06-14 DIAGNOSIS — M54.12 CERVICAL RADICULITIS: ICD-10-CM

## 2023-06-14 DIAGNOSIS — M79.18 MYOFASCIAL PAIN SYNDROME: ICD-10-CM

## 2023-06-14 DIAGNOSIS — M47.812 CERVICAL SPONDYLOSIS WITHOUT MYELOPATHY: ICD-10-CM

## 2023-06-14 DIAGNOSIS — G44.86 CERVICOGENIC HEADACHE: ICD-10-CM

## 2023-06-14 NOTE — PROGRESS NOTES
Subjective   CC neck pain, headaches  Sourav Proctor is a 67 y.o. male with chronic neck pain here for initial evaluation.  Referred by his PCP.  Complains of worsening chronic neck pain described as constant axial neck pain radiating to both shoulders and to the head associated with cervicogenic headaches constant but worse with activity or neck movements.  Denies balance issues, bladder bowel incontinence.  He has tried physical therapy several times over the last 10 years without relief.  Currently trying home exercise program without relief.  Tried anti-inflammatories, Tylenol without relief.  Neck pain significantly impairing ADL and interfering with sleep.    Imaging reviewed  C-spine MRI 2023: C7-T1: Suggested annular fissure in the posterior aspect of the disc. There is no definite disc herniation or interval foraminal stenosis. C5-6: Broad-based impression on the thecal sac which could reflect bulging of the annulus superimposed on more of an osseous bar. It suggested that there is some uncovertebral arthropathy. There is moderate narrowing of both imageable foramina.  Degenerative changes multiple level.    Pain Assessment   Location of Pain:  neck pain,   Description of Pain: Dull/Aching, Throbbing, Stabbing  Previous Pain Rating :3  Current Pain Rating: 3  Aggravating Factors: Activity  Alleviating Factors: Rest, Medication  Pain onset over 12 weeks  Interferes with ADL's.   [place] back pain disability scale on chart    PEG Assessment   What number best describes your pain on average in the past week?3  What number best describes how, during the past week, pain has interfered with your enjoyment of life?4  What number best describes how, during the past week, pain has interfered with your general activity? 8     The following portions of the patient's history were reviewed and updated as appropriate: allergies, current medications, past family history, past medical history, past social history, past  surgical history and problem list.     has a past medical history of Allergic (11/2020), Anemia, iron deficiency, Benign prostatic hyperplasia (04/05/2018), Bilateral presbyopia (12/06/2019), Cervical disc disorder (MRI shows issues), Chronic pain disorder (Neck pain), Combined forms of age-related cataract, bilateral (12/06/2019), DJD (degenerative joint disease) of cervical spine, Erectile dysfunction, Gastroesophageal reflux disease (10/24/2016), GERD (gastroesophageal reflux disease), Hypercholesterolemia, Hyperlipidemia (04/25/2016), Inguinal lymphadenopathy (09/26/2017), Joint pain (Ankle, neck), Keratitis (10/23/2017), Male erectile disorder (CODE) (04/25/2016), Neck pain (MRI), Nuclear senile cataract of both eyes (12/06/2019), Other specified retinal disorders (12/06/2019), PVD (posterior vitreous detachment), right eye (12/06/2019), Sleep apnea, obstructive, Subcutaneous nodule (04/24/2017), and Vitreous degeneration, bilateral (12/06/2019).   has a past surgical history that includes Vasectomy.  family history includes Coronary artery disease in his father; Heart disease in his paternal grandfather; Hypertension in his father; No Known Problems in his brother, brother, brother, mother, sister, and sister; Stroke in his maternal grandfather; Throat cancer in his paternal grandmother.  Social History     Tobacco Use    Smoking status: Never     Passive exposure: Never    Smokeless tobacco: Never   Substance Use Topics    Alcohol use: Yes     Alcohol/week: 4.0 standard drinks     Types: 4 Cans of beer per week       Review of Systems   Musculoskeletal:  Positive for myalgias and neck pain.   Neurological:  Positive for headache.   All other systems reviewed and are negative.    Objective   Physical Exam  Vitals reviewed.   Constitutional:       General: He is not in acute distress.  Pulmonary:      Effort: Pulmonary effort is normal.   Musculoskeletal:      Cervical back: Tenderness present. Decreased range  of motion.      Comments: Positive Spurling     /58   Pulse 71   Resp 16   SpO2 99%     PHQ 9 on chart  Opioid risk tool low risk      Assessment & Plan   Diagnoses and all orders for this visit:    1. Chronic pain syndrome (Primary)    2. Cervical spondylosis without myelopathy    3. Cervical radiculitis  -     Epidural Block    4. Myofascial pain syndrome    5. Cervicogenic headache      Summary  Sourav Proctor is a 67 y.o. male with chronic neck pain here for initial evaluation.  Referred by his PCP.  Complains of worsening chronic neck pain described as constant axial neck pain radiating to both shoulders and to the head associated with cervicogenic headaches constant but worse with activity or neck movements.  Denies balance issues, bladder bowel incontinence.  He has tried physical therapy several times over the last 10 years without relief.  Currently trying home exercise program without relief.  Tried anti-inflammatories, Tylenol without relief.  Neck pain significantly impairing ADL and interfering with sleep.    Worsening neck pain with cervicogenic headaches.  Marginal relief with physical therapy, home exercise program and medications.  Pain interfering with ADL and sleep.  We will schedule for cervical SUZANNA.  Risk and benefits discussed.    RTC for procedure.        Note is dictated utilizing voice recognition software. Unfortunately this leads to occasional typographical errors. I apologize in advance if the situation occurs. If questions occur please do not hesitate to call our office.

## 2023-08-08 ENCOUNTER — OFFICE VISIT (OUTPATIENT)
Dept: PAIN MEDICINE | Facility: CLINIC | Age: 68
End: 2023-08-08
Payer: COMMERCIAL

## 2023-08-08 VITALS
OXYGEN SATURATION: 99 % | RESPIRATION RATE: 16 BRPM | DIASTOLIC BLOOD PRESSURE: 78 MMHG | HEART RATE: 72 BPM | SYSTOLIC BLOOD PRESSURE: 129 MMHG

## 2023-08-08 DIAGNOSIS — M47.812 CERVICAL SPONDYLOSIS WITHOUT MYELOPATHY: ICD-10-CM

## 2023-08-08 DIAGNOSIS — M79.18 MYOFASCIAL PAIN SYNDROME: ICD-10-CM

## 2023-08-08 DIAGNOSIS — M54.12 CERVICAL RADICULITIS: ICD-10-CM

## 2023-08-08 DIAGNOSIS — G89.4 CHRONIC PAIN SYNDROME: Primary | ICD-10-CM

## 2023-08-08 RX ORDER — SYRINGE AND NEEDLE,INSULIN,1ML 31 GX5/16"
SYRINGE, EMPTY DISPOSABLE MISCELLANEOUS SEE ADMIN INSTRUCTIONS
COMMUNITY
Start: 2023-07-18

## 2023-08-08 RX ORDER — CHLORHEXIDINE GLUCONATE 0.12 MG/ML
RINSE ORAL
COMMUNITY
Start: 2023-07-27

## 2023-08-08 RX ORDER — AMOXICILLIN 500 MG/1
CAPSULE ORAL
COMMUNITY
Start: 2023-07-27

## 2023-08-08 NOTE — PROGRESS NOTES
Subjective   CC neck pain, headaches  Sourav Proctor is a 67 y.o. male with chronic neck pain here for follow-up.  Cervical SUZANNA last visit reports 50 to 60% relief especially bilateral shoulder upper extremity radicular pain.  Continues to have cervicogenic headache and axial neck pain.  Pain interfering with ADL and sleep.  Chronic neck pain described as constant axial neck pain radiating to both shoulders and to the head associated with cervicogenic headaches constant but worse with activity or neck movements.  Denies balance issues, bladder bowel incontinence.  He has tried physical therapy several times over the last 10 years without relief.  Currently trying home exercise program without relief.  Tried anti-inflammatories, Tylenol without relief.  Neck pain significantly impairing ADL and interfering with sleep.    Imaging reviewed  C-spine MRI 2023: C7-T1: Suggested annular fissure in the posterior aspect of the disc. There is no definite disc herniation or interval foraminal stenosis. C5-6: Broad-based impression on the thecal sac which could reflect bulging of the annulus superimposed on more of an osseous bar. It suggested that there is some uncovertebral arthropathy. There is moderate narrowing of both imageable foramina.  Degenerative changes multiple level.    Pain Assessment   Location of Pain:  neck pain,   Description of Pain: Dull/Aching, Throbbing, Stabbing  Previous Pain Rating :3  Current Pain Rating: 3  Aggravating Factors: Activity  Alleviating Factors: Rest, Medication  Pain onset over 12 weeks  Interferes with ADL's.   [place] back pain disability scale on chart    PEG Assessment   What number best describes your pain on average in the past week?3  What number best describes how, during the past week, pain has interfered with your enjoyment of life?2  What number best describes how, during the past week, pain has interfered with your general activity? 8     The following portions of the  patient's history were reviewed and updated as appropriate: allergies, current medications, past family history, past medical history, past social history, past surgical history and problem list.     has a past medical history of Allergic (11/2020), Anemia, iron deficiency, Benign prostatic hyperplasia (04/05/2018), Bilateral presbyopia (12/06/2019), Cervical disc disorder (MRI shows issues), Chronic pain disorder (Neck pain), Combined forms of age-related cataract, bilateral (12/06/2019), DJD (degenerative joint disease) of cervical spine, Erectile dysfunction, Gastroesophageal reflux disease (10/24/2016), GERD (gastroesophageal reflux disease), Hypercholesterolemia, Hyperlipidemia (04/25/2016), Inguinal lymphadenopathy (09/26/2017), Joint pain (Ankle, neck), Keratitis (10/23/2017), Male erectile disorder (CODE) (04/25/2016), Neck pain (MRI), Nuclear senile cataract of both eyes (12/06/2019), Other specified retinal disorders (12/06/2019), PVD (posterior vitreous detachment), right eye (12/06/2019), Sleep apnea, obstructive, Subcutaneous nodule (04/24/2017), and Vitreous degeneration, bilateral (12/06/2019).   has a past surgical history that includes Vasectomy and Teeth Extraction (08/2023).  family history includes Coronary artery disease in his father; Heart disease in his paternal grandfather; Hypertension in his father; No Known Problems in his brother, brother, brother, mother, sister, and sister; Stroke in his maternal grandfather; Throat cancer in his paternal grandmother.  Social History     Tobacco Use    Smoking status: Never     Passive exposure: Never    Smokeless tobacco: Never   Substance Use Topics    Alcohol use: Yes     Alcohol/week: 4.0 standard drinks     Types: 4 Cans of beer per week       Review of Systems   Musculoskeletal:  Positive for myalgias and neck pain.   Neurological:  Positive for headache.   All other systems reviewed and are negative.    Objective   Physical Exam  Vitals  reviewed.   Constitutional:       General: He is not in acute distress.  Pulmonary:      Effort: Pulmonary effort is normal.   Musculoskeletal:      Cervical back: Tenderness present. Decreased range of motion.      Comments: Positive Spurling     /78   Pulse 72   Resp 16   SpO2 99%     PHQ 9 on chart  Opioid risk tool low risk      Assessment & Plan   Diagnoses and all orders for this visit:    1. Chronic pain syndrome (Primary)    2. Cervical spondylosis without myelopathy    3. Myofascial pain syndrome    4. Cervical radiculitis  -     Epidural Block      Summary  Sourav Proctor is a 67 y.o. male with chronic neck pain here for follow-up.  Referred by his PCP.  Chronic and worsening neck pain with cervicogenic headaches.    Marginal relief with physical therapy, home exercise program and medications.  Pain interfering with ADL and sleep.    Cervical SUZANNA last visit reports 50 to 60% relief especially bilateral shoulders and upper extremity radicular pain.  Continues to have cervicogenic headache and axial neck pain.  Pain interfering with ADL and sleep.  We will schedule for repeat cervical SUZANNA. Risk and benefits discussed.    RTC for procedure.        Note is dictated utilizing voice recognition software. Unfortunately this leads to occasional typographical errors. I apologize in advance if the situation occurs. If questions occur please do not hesitate to call our office.

## 2023-08-15 DIAGNOSIS — K21.9 GASTROESOPHAGEAL REFLUX DISEASE WITHOUT ESOPHAGITIS: ICD-10-CM

## 2023-08-15 DIAGNOSIS — E78.2 MIXED HYPERLIPIDEMIA: ICD-10-CM

## 2023-08-15 RX ORDER — SIMVASTATIN 10 MG
10 TABLET ORAL NIGHTLY
Qty: 90 TABLET | Refills: 3 | Status: SHIPPED | OUTPATIENT
Start: 2023-08-15

## 2023-08-15 RX ORDER — OMEPRAZOLE 40 MG/1
40 CAPSULE, DELAYED RELEASE ORAL DAILY
Qty: 90 CAPSULE | Refills: 3 | Status: SHIPPED | OUTPATIENT
Start: 2023-08-15

## 2023-09-05 DIAGNOSIS — E78.2 MIXED HYPERLIPIDEMIA: ICD-10-CM

## 2023-09-05 RX ORDER — SIMVASTATIN 10 MG
10 TABLET ORAL NIGHTLY
Qty: 90 TABLET | Refills: 3 | Status: SHIPPED | OUTPATIENT
Start: 2023-09-05

## 2023-09-07 ENCOUNTER — TELEPHONE (OUTPATIENT)
Dept: FAMILY MEDICINE CLINIC | Facility: CLINIC | Age: 68
End: 2023-09-07
Payer: COMMERCIAL

## 2023-09-07 NOTE — TELEPHONE ENCOUNTER
"  Caller: Sourav Proctor \"Dereje\"    Relationship: Self    Best call back number: [phone]     What is the best time to reach you: ANY    Who are you requesting to speak with (clinical staff, provider,  specific staff member): CLINICAL    Do you know the name of the person who called: DEREJE    What was the call regarding:   PATIENT CALLED WANTING TO KNOW WHEN HE CAN GET THE NEW COVID BOOSTER AND IF HE NEEDS TO BE PUT ON A LIST TO GET THE SHOT      "

## 2023-09-07 NOTE — TELEPHONE ENCOUNTER
HUB TO SHARE: SENT FOLLOWING [facility] MESSAGE. We have never given covid shots in our office and would recommend you scheduling your booster at the pharmacy.

## 2023-10-04 ENCOUNTER — HOSPITAL ENCOUNTER (OUTPATIENT)
Dept: PAIN MEDICINE | Facility: HOSPITAL | Age: 68
Discharge: HOME | End: 2023-10-04
Payer: COMMERCIAL

## 2023-10-04 VITALS
HEIGHT: 67 IN | OXYGEN SATURATION: 99 % | HEART RATE: 71 BPM | TEMPERATURE: 97.7 F | SYSTOLIC BLOOD PRESSURE: 120 MMHG | BODY MASS INDEX: 26.06 KG/M2 | DIASTOLIC BLOOD PRESSURE: 84 MMHG | WEIGHT: 166 LBS | RESPIRATION RATE: 18 BRPM

## 2023-10-04 DIAGNOSIS — R52 PAIN: ICD-10-CM

## 2023-10-04 DIAGNOSIS — M54.12 CERVICAL RADICULITIS: Primary | ICD-10-CM

## 2023-10-04 PROCEDURE — 77003 FLUOROGUIDE FOR SPINE INJECT: CPT

## 2023-10-04 PROCEDURE — 25010000002 METHYLPREDNISOLONE PER 40 MG: Performed by: ANESTHESIOLOGY

## 2023-10-04 PROCEDURE — 25510000001 IOPAMIDOL 41 % SOLUTION: Performed by: ANESTHESIOLOGY

## 2023-10-04 RX ORDER — METHYLPREDNISOLONE ACETATE 40 MG/ML
40 INJECTION, SUSPENSION INTRA-ARTICULAR; INTRALESIONAL; INTRAMUSCULAR; SOFT TISSUE ONCE
Status: COMPLETED | OUTPATIENT
Start: 2023-10-04 | End: 2023-10-04

## 2023-10-04 RX ORDER — IOPAMIDOL 408 MG/ML
3 INJECTION, SOLUTION INTRATHECAL
Status: COMPLETED | OUTPATIENT
Start: 2023-10-04 | End: 2023-10-04

## 2023-10-04 RX ADMIN — METHYLPREDNISOLONE ACETATE 40 MG: 40 INJECTION, SUSPENSION INTRA-ARTICULAR; INTRALESIONAL; INTRAMUSCULAR; INTRASYNOVIAL; SOFT TISSUE at 15:03

## 2023-10-04 RX ADMIN — IOPAMIDOL 3 ML: 408 INJECTION, SOLUTION INTRATHECAL at 15:03

## 2023-10-04 NOTE — DISCHARGE INSTRUCTIONS

## 2023-10-05 ENCOUNTER — TELEPHONE (OUTPATIENT)
Dept: PAIN MEDICINE | Facility: HOSPITAL | Age: 68
End: 2023-10-05
Payer: COMMERCIAL

## 2023-10-05 NOTE — TELEPHONE ENCOUNTER
Post procedure phone call completed.  Pt states they are doing good and denies questions or concerns.  Patient reports pain level a #3.

## 2023-11-28 ENCOUNTER — OFFICE VISIT (OUTPATIENT)
Dept: PAIN MEDICINE | Facility: CLINIC | Age: 68
End: 2023-11-28
Payer: COMMERCIAL

## 2023-11-28 VITALS
SYSTOLIC BLOOD PRESSURE: 126 MMHG | OXYGEN SATURATION: 98 % | HEART RATE: 73 BPM | RESPIRATION RATE: 16 BRPM | DIASTOLIC BLOOD PRESSURE: 70 MMHG

## 2023-11-28 DIAGNOSIS — M79.18 MYOFASCIAL PAIN SYNDROME: ICD-10-CM

## 2023-11-28 DIAGNOSIS — G89.4 CHRONIC PAIN SYNDROME: Primary | ICD-10-CM

## 2023-11-28 DIAGNOSIS — M54.12 CERVICAL RADICULITIS: ICD-10-CM

## 2023-11-28 DIAGNOSIS — M47.812 CERVICAL SPONDYLOSIS WITHOUT MYELOPATHY: ICD-10-CM

## 2023-11-28 DIAGNOSIS — G44.86 CERVICOGENIC HEADACHE: ICD-10-CM

## 2023-11-28 NOTE — PROGRESS NOTES
Subjective   CC neck pain, headaches  Sourav Proctor is a 67 y.o. male with chronic neck pain here for follow-up.  Repeat cervical SUZANNA last visit reported 50% relief of neck pain, better relief with cervicogenic headaches however continues to have significant cervical myofascial pain.  Mild relief with ibuprofen PM.  Pain interfering with ADL and sleep.  Chronic neck pain described as constant axial neck pain radiating to both shoulders and to the head associated with cervicogenic headaches constant but worse with activity or neck movements.  Denies balance issues, bladder bowel incontinence.  He has tried physical therapy several times over the last 10 years without relief.  Currently trying home exercise program without relief.  Tried anti-inflammatories, Tylenol without relief.  Neck pain significantly impairing ADL and interfering with sleep.    Imaging reviewed  C-spine MRI 2023: C7-T1: Suggested annular fissure in the posterior aspect of the disc. There is no definite disc herniation or interval foraminal stenosis. C5-6: Broad-based impression on the thecal sac which could reflect bulging of the annulus superimposed on more of an osseous bar. It suggested that there is some uncovertebral arthropathy. There is moderate narrowing of both imageable foramina.  Degenerative changes multiple level.    Pain Assessment   Location of Pain:  neck pain,   Description of Pain: Dull/Aching, Throbbing, Stabbing  Previous Pain Rating :3  Current Pain Rating: 3  Aggravating Factors: Activity  Alleviating Factors: Rest, Medication  Pain onset over 12 weeks  Interferes with ADL's.   [place] back pain disability scale on chart    PEG Assessment   What number best describes your pain on average in the past week?3  What number best describes how, during the past week, pain has interfered with your enjoyment of life?3  What number best describes how, during the past week, pain has interfered with your general activity?6     The  following portions of the patient's history were reviewed and updated as appropriate: allergies, current medications, past family history, past medical history, past social history, past surgical history and problem list.     has a past medical history of Allergic (11/2020), Anemia, iron deficiency, Benign prostatic hyperplasia (04/05/2018), Bilateral presbyopia (12/06/2019), Cervical disc disorder (MRI shows issues), Chronic pain disorder (Neck pain), Combined forms of age-related cataract, bilateral (12/06/2019), DJD (degenerative joint disease) of cervical spine, Erectile dysfunction, Gastroesophageal reflux disease (10/24/2016), GERD (gastroesophageal reflux disease), Hypercholesterolemia, Hyperlipidemia (04/25/2016), Inguinal lymphadenopathy (09/26/2017), Joint pain (Ankle, neck), Keratitis (10/23/2017), Male erectile disorder (CODE) (04/25/2016), Neck pain (MRI), Nuclear senile cataract of both eyes (12/06/2019), Other specified retinal disorders (12/06/2019), PVD (posterior vitreous detachment), right eye (12/06/2019), Sleep apnea, obstructive, Subcutaneous nodule (04/24/2017), and Vitreous degeneration, bilateral (12/06/2019).   has a past surgical history that includes Vasectomy and Teeth Extraction (08/2023).  family history includes Coronary artery disease in his father; Heart disease in his paternal grandfather; Hypertension in his father; No Known Problems in his brother, brother, brother, mother, sister, and sister; Stroke in his maternal grandfather; Throat cancer in his paternal grandmother.  Social History     Tobacco Use    Smoking status: Never     Passive exposure: Never    Smokeless tobacco: Never   Substance Use Topics    Alcohol use: Yes     Alcohol/week: 4.0 standard drinks of alcohol     Types: 4 Cans of beer per week       Review of Systems   Musculoskeletal:  Positive for myalgias and neck pain.   Neurological:  Positive for headache.   All other systems reviewed and are  negative.      Objective   Physical Exam  Vitals reviewed.   Constitutional:       General: He is not in acute distress.  Pulmonary:      Effort: Pulmonary effort is normal.   Musculoskeletal:      Cervical back: Tenderness present. Decreased range of motion.      Comments: Positive Spurling       /70   Pulse 73   Resp 16   SpO2 98%     PHQ 9 on chart  Opioid risk tool low risk      Assessment & Plan   Diagnoses and all orders for this visit:    1. Chronic pain syndrome (Primary)  -     Ibuprofen 3 %, Gabapentin 10 %, Baclofen 2 %, lidocaine 4 %, Ketamine HCl 4 %; Apply 1-2 g topically to the appropriate area as directed 3 (Three) to 4 (Four) times daily.  Dispense: 90 g; Refill: 5    2. Cervical spondylosis without myelopathy    3. Cervical radiculitis  -     Epidural Block    4. Myofascial pain syndrome    5. Cervicogenic headache      Summary  Sourav Proctor is a 67 y.o. male with chronic neck pain here for follow-up.  Referred by his PCP.  Chronic and worsening neck pain with cervicogenic headaches.    Marginal relief with physical therapy, home exercise program and medications.  Pain interfering with ADL and sleep.    Repeat cervical SUZANNA last visit reported 50% relief of neck pain, better relief with cervicogenic headaches however continues to have significant cervical myofascial pain.  Mild relief with ibuprofen PM.  Pain interfering with ADL and sleep.  Will start compounding neuropathic/inflammatory cream with ketamine.    Will repeat cervical SUZANNA.  Discussed referral to neurosurgery, patient not interested in cervical spine surgery at this time.    RTC for procedure.        Note is dictated utilizing voice recognition software. Unfortunately this leads to occasional typographical errors. I apologize in advance if the situation occurs. If questions occur please do not hesitate to call our office.

## 2023-11-29 ENCOUNTER — TELEPHONE (OUTPATIENT)
Dept: PAIN MEDICINE | Facility: CLINIC | Age: 68
End: 2023-11-29
Payer: COMMERCIAL

## 2023-11-29 NOTE — TELEPHONE ENCOUNTER
"  Caller: Sourav Proctor \"Dereje\"    Relationship to patient: Self    Best call back number: [phone]    Chief complaint: NECK PAIN     Type of visit: CERVICAL EPIDURAL     Requested date: FEBRUARY OR MARCH      If rescheduling, when is the original appointment: 1-10-24     Additional notes:N/A            "

## 2023-12-07 RX ORDER — OFLOXACIN 3 MG/ML
1 SOLUTION/ DROPS OPHTHALMIC 4 TIMES DAILY
Qty: 2 ML | Refills: 0 | Status: SHIPPED | OUTPATIENT
Start: 2023-12-07 | End: 2023-12-14

## 2024-02-16 DIAGNOSIS — K21.9 GASTROESOPHAGEAL REFLUX DISEASE WITHOUT ESOPHAGITIS: ICD-10-CM

## 2024-02-16 RX ORDER — OMEPRAZOLE 40 MG/1
40 CAPSULE, DELAYED RELEASE ORAL DAILY
Qty: 90 CAPSULE | Refills: 3 | Status: SHIPPED | OUTPATIENT
Start: 2024-02-16

## 2024-02-22 DIAGNOSIS — K21.9 GASTROESOPHAGEAL REFLUX DISEASE WITHOUT ESOPHAGITIS: ICD-10-CM

## 2024-02-22 RX ORDER — OMEPRAZOLE 40 MG/1
40 CAPSULE, DELAYED RELEASE ORAL DAILY
Qty: 90 CAPSULE | Refills: 3 | Status: SHIPPED | OUTPATIENT
Start: 2024-02-22

## 2024-02-22 NOTE — TELEPHONE ENCOUNTER
Caller: [facility] - [place], [place] - [address] [place] [address] - [phone] [facility] [phone] FX    Relationship: Pharmacy    Best call back number: [phone]     Requested Prescriptions:   Requested Prescriptions     Pending Prescriptions Disp Refills    omeprazole (priLOSEC) 40 MG capsule 90 capsule 3     Sig: Take 1 capsule by mouth Daily.        Pharmacy where request should be sent: [facility] - [place], [place] - [address] [place] [address] - [phone] [facility] [phone] FX     Last office visit with prescribing clinician: 6/6/2023   Last telemedicine visit with prescribing clinician: Visit date not found   Next office visit with prescribing clinician: 6/10/2024     Travon Hicks Rep   02/22/24 15:42 EST

## 2024-03-04 ENCOUNTER — HOSPITAL ENCOUNTER (OUTPATIENT)
Dept: PAIN MEDICINE | Facility: HOSPITAL | Age: 69
Discharge: HOME | End: 2024-03-04
Payer: MEDICARE

## 2024-03-04 VITALS
SYSTOLIC BLOOD PRESSURE: 126 MMHG | WEIGHT: 166 LBS | BODY MASS INDEX: 26.06 KG/M2 | HEIGHT: 67 IN | OXYGEN SATURATION: 99 % | RESPIRATION RATE: 16 BRPM | HEART RATE: 81 BPM | TEMPERATURE: 97.3 F | DIASTOLIC BLOOD PRESSURE: 80 MMHG

## 2024-03-04 DIAGNOSIS — M54.12 CERVICAL RADICULITIS: Primary | ICD-10-CM

## 2024-03-04 DIAGNOSIS — R52 PAIN: ICD-10-CM

## 2024-03-04 PROCEDURE — 25010000002 METHYLPREDNISOLONE PER 40 MG: Performed by: ANESTHESIOLOGY

## 2024-03-04 PROCEDURE — 62321 NJX INTERLAMINAR CRV/THRC: CPT | Performed by: ANESTHESIOLOGY

## 2024-03-04 PROCEDURE — 77003 FLUOROGUIDE FOR SPINE INJECT: CPT

## 2024-03-04 PROCEDURE — 25510000001 IOPAMIDOL 41 % SOLUTION: Performed by: ANESTHESIOLOGY

## 2024-03-04 RX ORDER — METHYLPREDNISOLONE ACETATE 40 MG/ML
40 INJECTION, SUSPENSION INTRA-ARTICULAR; INTRALESIONAL; INTRAMUSCULAR; SOFT TISSUE ONCE
Status: COMPLETED | OUTPATIENT
Start: 2024-03-04 | End: 2024-03-04

## 2024-03-04 RX ORDER — IOPAMIDOL 408 MG/ML
3 INJECTION, SOLUTION INTRATHECAL
Status: COMPLETED | OUTPATIENT
Start: 2024-03-04 | End: 2024-03-04

## 2024-03-04 RX ADMIN — IOPAMIDOL 3 ML: 408 INJECTION, SOLUTION INTRATHECAL at 08:47

## 2024-03-04 RX ADMIN — METHYLPREDNISOLONE ACETATE 40 MG: 40 INJECTION, SUSPENSION INTRA-ARTICULAR; INTRALESIONAL; INTRAMUSCULAR; INTRASYNOVIAL; SOFT TISSUE at 08:48

## 2024-03-04 NOTE — DISCHARGE INSTRUCTIONS

## 2024-03-04 NOTE — PROCEDURES
"Subjective   CC neck pain  Sourav Proctor is a 68 y.o. male with cervical radiculitis here for repeat cervical SUZANNA.  No anticoagulation    Pain Assessment   Location of Pain: Lower Back, R Hip, L Hip, neck pain, joint  Description of Pain: Dull/Aching, Throbbing, Stabbing  Previous Pain Rating :2  Current Pain Ratin  Aggravating Factors: Activity  Alleviating Factors: Rest, Medication  Pain onset over 12 weeks ago  Pain interferes with ADL's  [place] back pain disability scale scanned in chart     The following portions of the patient's history were reviewed and updated as appropriate: allergies, current medications, past family history, past medical history, past social history, past surgical history and problem list.      Review of Systems  As in HPI  Objective   Physical Exam  Vitals reviewed.   Constitutional:       General: He is not in acute distress.  Pulmonary:      Effort: Pulmonary effort is normal.       /80   Pulse 81   Temp 97.3 °F (36.3 °C) (Skin)   Resp 16   Ht 170.2 cm (67\")   Wt 75.3 kg (166 lb)   SpO2 99%   BMI 26.00 kg/m²     Assessment & Plan    underwent repeat cervical SUZANNA    RTC 4-6 weeks or as needed for repeat    DATE OF PROCEDURE:  3/4/2024    PREOPERATIVE DIAGNOSIS:Cervical radiculitis    POSTOPERATIVE DIAGNOSIS: same    PROCEDURE PERFORMED:  cervical Epidural Steroid Injection    The patient presents with a history of   cervical degenerative disc disease  with  radiculitis. The patient presents today for a  cervical  epidural steroid injection at level C6/7. The patient understands the risks and benefits of the procedure and wishes to proceed.  The patient was seen in the preoperative area.  Patient's consent was obtained and updated.  Vitals were taken.  Patient was then brought to the procedure suite and placed in a prone position. The appropriate anatomic area was widely prepped with Chloraprep and draped in a sterile fashion. Noninvasive monitoring per routine " anesthesia protocol was placed.  Under fluoroscopic guidance using  AP view a 20 gauge styleted tuohy needle was passed through skin anesthetized with 1% Lidocaine without epinephrine.  The needle was advanced using the continuous loss of resistance to saline technique into the C6 epidural space. Needle tip placement in the epidural space was confirmed by loss of resistance and injection of 1 mL of  preservative free contrast.  Following this 7 mL of a solution containing  1 mL of 40 mg Depo-Medrol and 7 mL of preservative-free saline was carefully administered in the epidural space. Epidurogram following injection demonstrated dye spread as high as C4 and as low as T1. A sterile dressing was placed over the puncture site.    The patient tolerated the procedure with no complications . They were then brought to the post procedure area where they recovered nicely.

## 2024-03-05 ENCOUNTER — TELEPHONE (OUTPATIENT)
Dept: PAIN MEDICINE | Facility: HOSPITAL | Age: 69
End: 2024-03-05
Payer: MEDICARE

## 2024-03-05 NOTE — TELEPHONE ENCOUNTER
Post procedure phone call completed.  Pt states they are doing good and denies questions or concerns. Pain is at a 1 on a 1-10 scale

## 2024-04-02 DIAGNOSIS — Z00.00 ENCOUNTER FOR MEDICARE ANNUAL WELLNESS EXAM: ICD-10-CM

## 2024-04-02 DIAGNOSIS — E55.9 VITAMIN D DEFICIENCY: Primary | ICD-10-CM

## 2024-04-16 ENCOUNTER — OFFICE VISIT (OUTPATIENT)
Dept: PAIN MEDICINE | Facility: CLINIC | Age: 69
End: 2024-04-16
Payer: MEDICARE

## 2024-04-16 VITALS
WEIGHT: 167.2 LBS | HEART RATE: 74 BPM | SYSTOLIC BLOOD PRESSURE: 126 MMHG | DIASTOLIC BLOOD PRESSURE: 76 MMHG | BODY MASS INDEX: 26.19 KG/M2 | OXYGEN SATURATION: 98 % | RESPIRATION RATE: 16 BRPM

## 2024-04-16 DIAGNOSIS — M79.18 MYOFASCIAL PAIN SYNDROME: ICD-10-CM

## 2024-04-16 DIAGNOSIS — M54.12 CERVICAL RADICULITIS: ICD-10-CM

## 2024-04-16 DIAGNOSIS — G89.4 CHRONIC PAIN SYNDROME: Primary | ICD-10-CM

## 2024-04-16 DIAGNOSIS — M47.812 CERVICAL SPONDYLOSIS WITHOUT MYELOPATHY: ICD-10-CM

## 2024-04-16 RX ORDER — MELOXICAM 15 MG/1
15 TABLET ORAL DAILY PRN
Qty: 90 TABLET | Refills: 0 | Status: SHIPPED | OUTPATIENT
Start: 2024-04-16

## 2024-04-16 RX ORDER — GABAPENTIN 300 MG/1
300 CAPSULE ORAL NIGHTLY PRN
Qty: 90 CAPSULE | Refills: 0 | Status: SHIPPED | OUTPATIENT
Start: 2024-04-16

## 2024-04-17 NOTE — PROGRESS NOTES
Subjective   CC neck pain, headaches  Sourav Proctor is a 68 y.o. male with chronic neck pain here for follow-up.  Repeat cervical SUZANNA with 50 to 60% relief.  Continued myofascial neck pain mostly at night.  Having to take Advil PM almost daily.  Chronic neck pain described as constant axial neck pain radiating to both shoulders and to the head associated with cervicogenic headaches constant but worse with activity or neck movements.  Denies balance issues, bladder bowel incontinence.  He has tried physical therapy several times over the last 10 years without relief.  Currently trying home exercise program without relief.  Tried anti-inflammatories, Tylenol without relief.  Neck pain significantly impairing ADL and interfering with sleep.    Imaging reviewed  C-spine MRI 2023: C7-T1: Suggested annular fissure in the posterior aspect of the disc. There is no definite disc herniation or interval foraminal stenosis. C5-6: Broad-based impression on the thecal sac which could reflect bulging of the annulus superimposed on more of an osseous bar. It suggested that there is some uncovertebral arthropathy. There is moderate narrowing of both imageable foramina.  Degenerative changes multiple level.    Pain Assessment   Location of Pain:  neck pain,   Description of Pain: Dull/Aching, Throbbing, Stabbing  Previous Pain Rating :3  Current Pain Rating: 3  Aggravating Factors: Activity  Alleviating Factors: Rest, Medication  Pain onset over 12 weeks  Interferes with ADL's.   [place] back pain disability scale on chart    PEG Assessment   What number best describes your pain on average in the past week?3  What number best describes how, during the past week, pain has interfered with your enjoyment of life?3  What number best describes how, during the past week, pain has interfered with your general activity?5    The following portions of the patient's history were reviewed and updated as appropriate: allergies, current  medications, past family history, past medical history, past social history, past surgical history and problem list.     has a past medical history of Allergic (11/2020), Anemia, iron deficiency, Benign prostatic hyperplasia (04/05/2018), Bilateral presbyopia (12/06/2019), Cervical disc disorder (MRI shows issues), Chronic pain disorder (Neck pain), Combined forms of age-related cataract, bilateral (12/06/2019), DJD (degenerative joint disease) of cervical spine, Erectile dysfunction, Gastroesophageal reflux disease (10/24/2016), GERD (gastroesophageal reflux disease), Hypercholesterolemia, Hyperlipidemia (04/25/2016), Inguinal lymphadenopathy (09/26/2017), Joint pain (Ankle, neck), Keratitis (10/23/2017), Male erectile disorder (CODE) (04/25/2016), Neck pain (MRI), Nuclear senile cataract of both eyes (12/06/2019), Other specified retinal disorders (12/06/2019), PVD (posterior vitreous detachment), right eye (12/06/2019), Sleep apnea, obstructive, Subcutaneous nodule (04/24/2017), and Vitreous degeneration, bilateral (12/06/2019).   has a past surgical history that includes Vasectomy and Teeth Extraction (08/2023).  family history includes Coronary artery disease in his father; Heart disease in his paternal grandfather; Hypertension in his father; No Known Problems in his brother, brother, brother, mother, sister, and sister; Stroke in his maternal grandfather; Throat cancer in his paternal grandmother.    Review of Systems   Musculoskeletal:  Positive for myalgias and neck pain.   Neurological:  Positive for headache.   All other systems reviewed and are negative.      Objective   Physical Exam  Vitals reviewed.   Constitutional:       General: He is not in acute distress.  Pulmonary:      Effort: Pulmonary effort is normal.   Musculoskeletal:      Cervical back: Tenderness present. Decreased range of motion.      Comments: Positive [place]       /76 (BP Location: Left arm, Patient Position: Sitting, Cuff  Size: Adult)   Pulse 74   Resp 16   Wt 75.8 kg (167 lb 3.2 oz)   SpO2 98%   BMI 26.19 kg/m²     PHQ 9 on chart  Opioid risk tool low risk      Assessment & Plan   Diagnoses and all orders for this visit:    1. Chronic pain syndrome (Primary)  -     gabapentin (NEURONTIN) 300 MG capsule; Take 1 capsule by mouth At Night As Needed (pain).  Dispense: 90 capsule; Refill: 0  -     meloxicam (MOBIC) 15 MG tablet; Take 1 tablet by mouth Daily As Needed for Moderate Pain.  Dispense: 90 tablet; Refill: 0    2. Cervical spondylosis without myelopathy    3. Cervical radiculitis  -     gabapentin (NEURONTIN) 300 MG capsule; Take 1 capsule by mouth At Night As Needed (pain).  Dispense: 90 capsule; Refill: 0    4. Myofascial pain syndrome      Summary  Sourav Proctor is a 68 y.o. male with chronic neck pain here for follow-up.  Referred by his PCP.  Chronic and worsening neck pain with cervicogenic headaches.    Marginal relief with physical therapy, home exercise program and medications.  Pain interfering with ADL and sleep.    Repeat cervical SUZANNA with 50 to 60% relief.  Continued myofascial neck pain mostly at night.  Having to take Advil PM almost daily.  Start gabapentin at bedtime and meloxicam as needed.  Repeat cervical SUZANNA as needed.    RTC 3 months or as needed for procedure.          Note is dictated utilizing voice recognition software. Unfortunately this leads to occasional typographical errors. I apologize in advance if the situation occurs. If questions occur please do not hesitate to call our office.

## 2024-04-23 ENCOUNTER — OFFICE VISIT (OUTPATIENT)
Dept: SLEEP MEDICINE | Facility: CLINIC | Age: 69
End: 2024-04-23
Payer: MEDICARE

## 2024-04-23 VITALS
BODY MASS INDEX: 25.9 KG/M2 | OXYGEN SATURATION: 99 % | HEART RATE: 73 BPM | WEIGHT: 165 LBS | SYSTOLIC BLOOD PRESSURE: 122 MMHG | HEIGHT: 67 IN | DIASTOLIC BLOOD PRESSURE: 69 MMHG

## 2024-04-23 DIAGNOSIS — G47.33 OSA ON CPAP: Primary | ICD-10-CM

## 2024-04-23 PROCEDURE — 99213 OFFICE O/P EST LOW 20 MIN: CPT | Performed by: INTERNAL MEDICINE

## 2024-04-23 PROCEDURE — 1159F MED LIST DOCD IN RCRD: CPT | Performed by: INTERNAL MEDICINE

## 2024-04-23 PROCEDURE — G0463 HOSPITAL OUTPT CLINIC VISIT: HCPCS

## 2024-04-23 PROCEDURE — 1160F RVW MEDS BY RX/DR IN RCRD: CPT | Performed by: INTERNAL MEDICINE

## 2024-04-23 NOTE — PROGRESS NOTES
"  [place] [id number] [facility], [address]  [place] [address]  Phone   Fax       SLEEP CLINIC FOLLOW UP PROGRESS NOTE.    Sourav Proctor  [id number]   1955  68 y.o.  male      PCP: Celestine Payne PA-C      Date of visit: 4/23/2024    Chief Complaint   Patient presents with    Follow-up    Sleep Apnea       HPI:  This is a 68 y.o. years old patient is here for the management of obstructive sleep apnea.  Sleep apnea is mild to moderate in severity with a AHI of 6/h and in supine 15/hr. Patient is using positive airway pressure therapy with auto CPAP and the symptoms of sleep apnea have improved significantly on the therapy. Normally patient goes to bed at 10 PM and wakes up at 5 AM .  The patient wakes up 2-3 time(s) during the night and has no problem going back to sleep.  Feels refreshed after waking up.  He is having neck pain and is on gabapentin which has helped him.    Medications and allergies are reviewed by me and documented in the encounter.     SOCIAL (habits pertaining to sleep medicine)  History tobacco use:No   History of alcohol use: 4 per week  Caffeine use: 4     REVIEW OF SYSTEMS:   Pertaining positive symptoms are:  [place] Sleepiness Scale :Total score: 8   GERD       PHYSICAL EXAMINATION:  CONSTITUTIONAL:  Vitals:    04/23/24 0900   BP: 122/69   BP Location: Left arm   Patient Position: Sitting   Pulse: 73   SpO2: 99%   Weight: 74.8 kg (165 lb)   Height: 170.2 cm (67\")    Body mass index is 25.84 kg/m².   NOSE: nasal passages are clear, No deformities noted   RESP SYSTEM: Not in any respiratory distress, no chest deformities noted,   CARDIOVASULAR: No edema noted  NEURO: Oriented x 3, gait normal,  Mood and affect appeared appropriate      Data reviewed:  The Smart card downloaded on 4/23/2024 has been reviewed independently by me for compliance and discussed the data with the patient.   Compliance; 100%  More than 4 hr use, 100%  Average use " of the device 7 hours and 11 minutes per night  Residual AHI: 4.5 /hr (goal < 5.0 /hr)  Mask type: Fullface mask  Device: DreamStation 2  DME: [facility]      ASSESSMENT AND PLAN:  Obstructive sleep apnea ( G 47.33).  The symptoms of sleep apnea have improved with the device and the treatment.  Patient's compliance with the device is excellent for treatment of sleep apnea.  I have independently reviewed the smart card down load and discussed with the patient the download data and encouarged the patient to continue to use the device.The residual AHI is acceptable. The device is benefiting the patient and the device is medically necessary.  Without proper control of sleep apnea and good compliance there is a increased risk for hypertension, diabetes mellitus and nonrestorative sleep with hypersomnia which can increase risk for motor vehicle accidents.  Untreated sleep apnea is also a risk factor for development of atrial fibrillation, pulmonary hypertension, insulin resistance and stroke. The patient is also instructed to get the supplies from the [employer] company and and change them on a regular basis.  A prescription for supplies has been sent to the [employer] company.  I have also discussed the good sleep hygiene habits and adequate amount of sleep needed for good health.  Return in about 1 year (around 4/23/2025) for with smart card down load. . Patient's questions were answered.    4/23/2024  Anastasia Handy MD  Sleep Medicine.  Medical Director,   [facility] sleep centers.

## 2024-04-30 DIAGNOSIS — E78.2 MIXED HYPERLIPIDEMIA: ICD-10-CM

## 2024-04-30 RX ORDER — SIMVASTATIN 10 MG
10 TABLET ORAL NIGHTLY
Qty: 90 TABLET | Refills: 3 | Status: SHIPPED | OUTPATIENT
Start: 2024-04-30

## 2024-06-05 ENCOUNTER — LAB (OUTPATIENT)
Dept: FAMILY MEDICINE CLINIC | Facility: CLINIC | Age: 69
End: 2024-06-05
Payer: MEDICARE

## 2024-06-05 LAB
25(OH)D3 SERPL-MCNC: 36.4 NG/ML (ref 30–100)
ALBUMIN SERPL-MCNC: 4.3 G/DL (ref 3.5–5.2)
ALBUMIN/GLOB SERPL: 1.7 G/DL
ALP SERPL-CCNC: 64 U/L (ref 39–117)
ALT SERPL W P-5'-P-CCNC: 16 U/L (ref 1–41)
ANION GAP SERPL CALCULATED.3IONS-SCNC: 9.8 MMOL/L (ref 5–15)
AST SERPL-CCNC: 15 U/L (ref 1–40)
BASOPHILS # BLD AUTO: 0.03 10*3/MM3 (ref 0–0.2)
BASOPHILS NFR BLD AUTO: 0.5 % (ref 0–1.5)
BILIRUB SERPL-MCNC: 0.5 MG/DL (ref 0–1.2)
BUN SERPL-MCNC: 14 MG/DL (ref 8–23)
BUN/CREAT SERPL: 15.2 (ref 7–25)
CALCIUM SPEC-SCNC: 9.8 MG/DL (ref 8.6–10.5)
CHLORIDE SERPL-SCNC: 104 MMOL/L (ref 98–107)
CHOLEST SERPL-MCNC: 189 MG/DL (ref 0–200)
CO2 SERPL-SCNC: 26.2 MMOL/L (ref 22–29)
CREAT SERPL-MCNC: 0.92 MG/DL (ref 0.76–1.27)
DEPRECATED RDW RBC AUTO: 43.4 FL (ref 37–54)
EGFRCR SERPLBLD CKD-EPI 2021: 91.2 ML/MIN/1.73
EOSINOPHIL # BLD AUTO: 0.08 10*3/MM3 (ref 0–0.4)
EOSINOPHIL NFR BLD AUTO: 1.4 % (ref 0.3–6.2)
ERYTHROCYTE [DISTWIDTH] IN BLOOD BY AUTOMATED COUNT: 13.3 % (ref 12.3–15.4)
GLOBULIN UR ELPH-MCNC: 2.6 GM/DL
GLUCOSE SERPL-MCNC: 91 MG/DL (ref 65–99)
HBA1C MFR BLD: 5.84 % (ref 4.8–5.6)
HCT VFR BLD AUTO: 42.6 % (ref 37.5–51)
HDLC SERPL-MCNC: 50 MG/DL (ref 40–60)
HGB BLD-MCNC: 14.2 G/DL (ref 13–17.7)
IMM GRANULOCYTES # BLD AUTO: 0.01 10*3/MM3 (ref 0–0.05)
IMM GRANULOCYTES NFR BLD AUTO: 0.2 % (ref 0–0.5)
LDLC SERPL CALC-MCNC: 112 MG/DL (ref 0–100)
LDLC/HDLC SERPL: 2.16 {RATIO}
LYMPHOCYTES # BLD AUTO: 1.68 10*3/MM3 (ref 0.7–3.1)
LYMPHOCYTES NFR BLD AUTO: 28.6 % (ref 19.6–45.3)
MCH RBC QN AUTO: 30 PG (ref 26.6–33)
MCHC RBC AUTO-ENTMCNC: 33.3 G/DL (ref 31.5–35.7)
MCV RBC AUTO: 90.1 FL (ref 79–97)
MONOCYTES # BLD AUTO: 0.58 10*3/MM3 (ref 0.1–0.9)
MONOCYTES NFR BLD AUTO: 9.9 % (ref 5–12)
NEUTROPHILS NFR BLD AUTO: 3.49 10*3/MM3 (ref 1.7–7)
NEUTROPHILS NFR BLD AUTO: 59.4 % (ref 42.7–76)
NRBC BLD AUTO-RTO: 0 /100 WBC (ref 0–0.2)
PLATELET # BLD AUTO: 259 10*3/MM3 (ref 140–450)
PMV BLD AUTO: 10.2 FL (ref 6–12)
POTASSIUM SERPL-SCNC: 4.3 MMOL/L (ref 3.5–5.2)
PROT SERPL-MCNC: 6.9 G/DL (ref 6–8.5)
RBC # BLD AUTO: 4.73 10*6/MM3 (ref 4.14–5.8)
SODIUM SERPL-SCNC: 140 MMOL/L (ref 136–145)
T4 FREE SERPL-MCNC: 1.15 NG/DL (ref 0.92–1.68)
TRIGL SERPL-MCNC: 154 MG/DL (ref 0–150)
TSH SERPL DL<=0.05 MIU/L-ACNC: 1.71 UIU/ML (ref 0.27–4.2)
VIT B12 BLD-MCNC: 440 PG/ML (ref 211–946)
VLDLC SERPL-MCNC: 27 MG/DL (ref 5–40)
WBC NRBC COR # BLD AUTO: 5.87 10*3/MM3 (ref 3.4–10.8)

## 2024-06-05 PROCEDURE — 82607 VITAMIN B-12: CPT | Performed by: PHYSICIAN ASSISTANT

## 2024-06-05 PROCEDURE — 84443 ASSAY THYROID STIM HORMONE: CPT | Performed by: PHYSICIAN ASSISTANT

## 2024-06-05 PROCEDURE — 83036 HEMOGLOBIN GLYCOSYLATED A1C: CPT | Performed by: PHYSICIAN ASSISTANT

## 2024-06-05 PROCEDURE — 82306 VITAMIN D 25 HYDROXY: CPT | Performed by: PHYSICIAN ASSISTANT

## 2024-06-05 PROCEDURE — 80061 LIPID PANEL: CPT | Performed by: PHYSICIAN ASSISTANT

## 2024-06-05 PROCEDURE — 85025 COMPLETE CBC W/AUTO DIFF WBC: CPT | Performed by: PHYSICIAN ASSISTANT

## 2024-06-05 PROCEDURE — 84439 ASSAY OF FREE THYROXINE: CPT | Performed by: PHYSICIAN ASSISTANT

## 2024-06-05 PROCEDURE — 80053 COMPREHEN METABOLIC PANEL: CPT | Performed by: PHYSICIAN ASSISTANT

## 2024-06-10 ENCOUNTER — OFFICE VISIT (OUTPATIENT)
Dept: FAMILY MEDICINE CLINIC | Facility: CLINIC | Age: 69
End: 2024-06-10
Payer: MEDICARE

## 2024-06-10 VITALS
DIASTOLIC BLOOD PRESSURE: 72 MMHG | SYSTOLIC BLOOD PRESSURE: 120 MMHG | OXYGEN SATURATION: 99 % | RESPIRATION RATE: 18 BRPM | WEIGHT: 163.2 LBS | HEART RATE: 62 BPM | HEIGHT: 67 IN | BODY MASS INDEX: 25.62 KG/M2

## 2024-06-10 DIAGNOSIS — K21.9 GASTROESOPHAGEAL REFLUX DISEASE WITHOUT ESOPHAGITIS: ICD-10-CM

## 2024-06-10 DIAGNOSIS — E55.9 VITAMIN D DEFICIENCY: ICD-10-CM

## 2024-06-10 DIAGNOSIS — E78.2 MIXED HYPERLIPIDEMIA: ICD-10-CM

## 2024-06-10 DIAGNOSIS — G47.33 OSA ON CPAP: ICD-10-CM

## 2024-06-10 DIAGNOSIS — M50.30 DDD (DEGENERATIVE DISC DISEASE), CERVICAL: ICD-10-CM

## 2024-06-10 DIAGNOSIS — Z00.00 MEDICARE ANNUAL WELLNESS VISIT, INITIAL: Primary | ICD-10-CM

## 2024-06-10 PROCEDURE — G0402 INITIAL PREVENTIVE EXAM: HCPCS | Performed by: PHYSICIAN ASSISTANT

## 2024-06-10 PROCEDURE — 1125F AMNT PAIN NOTED PAIN PRSNT: CPT | Performed by: PHYSICIAN ASSISTANT

## 2024-06-10 PROCEDURE — 1160F RVW MEDS BY RX/DR IN RCRD: CPT | Performed by: PHYSICIAN ASSISTANT

## 2024-06-10 PROCEDURE — 99213 OFFICE O/P EST LOW 20 MIN: CPT | Performed by: PHYSICIAN ASSISTANT

## 2024-06-10 PROCEDURE — 1159F MED LIST DOCD IN RCRD: CPT | Performed by: PHYSICIAN ASSISTANT

## 2024-06-10 NOTE — PROGRESS NOTES
"The ABCs of the Annual Wellness Visit  Welcome to Medicare Visit    Subjective     Sourav Proctor is a 68 y.o. male who presents for a  Welcome to Medicare Visit.    The following portions of the patient's history were reviewed and   updated as appropriate: allergies, current medications, past family history, past medical history, past social history, past surgical history, and problem list.     Compared to one year ago, the patient feels his physical   health is the same.    Compared to one year ago, the patient feels his mental   health is the same.    Recent Hospitalizations:  He was not admitted to the hospital during the last year.       Current Medical Providers:  Patient Care Team:  Celestine Payne PA-C as PCP - General (Physician Assistant)    Outpatient Medications Prior to Visit   Medication Sig Dispense Refill    Auvi-Q 0.3 MG/0.3ML solution auto-injector injection INJECT AS NEEDED FOR SEVERE ALLERGIC REACTION INCLUDING ANAPHYLAXIS AS DIRECTED      Chondroitin Sulfate 400 MG capsule       Easy Touch Insulin Syringe 31G X 5/16\" 1 ML misc See Admin Instructions.      gabapentin (NEURONTIN) 300 MG capsule Take 1 capsule by mouth At Night As Needed (pain). 90 capsule 0    meloxicam (MOBIC) 15 MG tablet Take 1 tablet by mouth Daily As Needed for Moderate Pain. 90 tablet 0    NON FORMULARY Trimix 30mg/2mg/20mcg Per Ml Injection      omeprazole (priLOSEC) 40 MG capsule Take 1 capsule by mouth Daily. 90 capsule 3    simvastatin (ZOCOR) 10 MG tablet Take 1 tablet by mouth Every Night. 90 tablet 3    Ibuprofen 3 %, Gabapentin 10 %, Baclofen 2 %, lidocaine 4 %, Ketamine HCl 4 % Apply 1-2 g topically to the appropriate area as directed 3 (Three) to 4 (Four) times daily. 90 g 5     No facility-administered medications prior to visit.       No opioid medication identified on active medication list. I have reviewed chart for other potential  high risk medication/s and harmful drug interactions in the " "elderly.        Aspirin is not on active medication list.  Aspirin use is not indicated based on review of current medical condition/s. Risk of harm outweighs potential benefits.  .    Patient Active Problem List   Diagnosis    Bilateral presbyopia    Nuclear senile cataract of both eyes    Combined forms of age-related cataract, bilateral    Encounter for general adult medical examination without abnormal findings    Gastroesophageal reflux disease    Mixed hyperlipidemia    Male erectile disorder (CODE)    Other specified postprocedural states    PVD (posterior vitreous detachment), right eye    Vitreous degeneration, bilateral    TREY on CPAP    Gout of left foot    Vitreous degeneration    Annual physical exam    Tinnitus of both ears    Benign essential tremor    Cervicalgia    Iron deficiency anemia    Vitamin D deficiency    DDD (degenerative disc disease), cervical     Advance Care Planning   Advance Care Planning     Advance Directive is not on file.  ACP discussion was held with the patient during this visit. Patient does not have an advance directive, declines further assistance.       Objective   Vitals:    06/10/24 1029   BP: 120/72   Pulse: 62   Resp: 18   SpO2: 99%   Weight: 74 kg (163 lb 3.2 oz)   Height: 170.2 cm (67\")     Estimated body mass index is 25.56 kg/m² as calculated from the following:    Height as of this encounter: 170.2 cm (67\").    Weight as of this encounter: 74 kg (163 lb 3.2 oz).    BMI is >= 25 and <30. (Overweight) The following options were offered after discussion;: exercise counseling/recommendations and nutrition counseling/recommendations      Does the patient have evidence of cognitive impairment?   No    Lab Results   Component Value Date    TRIG 154 (H) 06/05/2024    HDL 50 06/05/2024     (H) 06/05/2024    VLDL 27 06/05/2024    HGBA1C 5.84 (H) 06/05/2024       Procedures       HEALTH RISK ASSESSMENT    Smoking Status:  Social History     Tobacco Use   Smoking Status " Never    Passive exposure: Never   Smokeless Tobacco Never     Alcohol Consumption:  Social History     Substance and Sexual Activity   Alcohol Use Yes    Alcohol/week: 4.0 standard drinks of alcohol    Types: 4 Cans of beer per week       Fall Risk Screen:    JONATHAN Fall Risk Assessment was completed, and patient is at LOW risk for falls.Assessment completed on:6/10/2024    Depression Screen:       6/10/2024    10:28 AM   PHQ-2/PHQ-9 Depression Screening   Little Interest or Pleasure in Doing Things 0-->not at all   Feeling Down, Depressed or Hopeless 0-->not at all   PHQ-9: Brief Depression Severity Measure Score 0       Health Habits and Functional and Cognitive Screenin/10/2024    10:28 AM   Functional & Cognitive Status   Do you have difficulty preparing food and eating? No   Do you have difficulty bathing yourself, getting dressed or grooming yourself? No   Do you have difficulty using the toilet? No   Do you have difficulty moving around from place to place? No   Do you have trouble with steps or getting out of a bed or a chair? No   Current Diet Well Balanced Diet   Dental Exam Up to date   Eye Exam Up to date   Exercise (times per week) 3 times per week   Current Exercises Include Walking   Do you need help using the phone?  No   Are you deaf or do you have serious difficulty hearing?  No   Do you need help to go to places out of walking distance? No   Do you need help shopping? No   Do you need help preparing meals?  No   Do you need help with housework?  No   Do you need help with laundry? No   Do you need help taking your medications? No   Do you need help managing money? No   Do you ever drive or ride in a car without wearing a seat belt? No   Have you felt unusual stress, anger or loneliness in the last month? No   Who do you live with? Spouse   If you need help, do you have trouble finding someone available to you? No   Have you been bothered in the last four weeks by sexual problems? No   Do  you have difficulty concentrating, remembering or making decisions? No       Visual Acuity:    Vision Screening    Right eye Left eye Both eyes   Without correction 20/20 20/50 20/25   With correction          Age-appropriate Screening Schedule:  Refer to the list below for future screening recommendations based on patient's age, sex and/or medical conditions. Orders for these recommended tests are listed in the plan section. The patient has been provided with a written plan.    Health Maintenance   Topic Date Due    RSV Vaccine - Adults (1 - 1-dose 60+ series) Never done    Pneumococcal Vaccine 65+ (1 of 1 - PCV) Never done    COVID-19 Vaccine (4 - 2023-24 season) 09/01/2023    INFLUENZA VACCINE  08/01/2024    LIPID PANEL  06/05/2025    ANNUAL WELLNESS VISIT  06/10/2025    BMI FOLLOWUP  06/10/2025    COLORECTAL CANCER SCREENING  11/09/2025    TDAP/TD VACCINES (2 - Tdap) 07/01/2030    ZOSTER VACCINE  Completed    HEPATITIS C SCREENING  Addressed        CMS Preventative Services Quick Reference  Risk Factors Identified During Encounter    Immunizations Discussed/Encouraged: Prevnar 20 (Pneumococcal 20-valent conjugate)  Dental Screening Recommended  Vision Screening Recommended  The above risks/problems have been discussed with the patient.  Pertinent information has been shared with the patient in the After Visit Summary.    Follow Up:   Initial Medicare Visit in one year    An After Visit Summary and PPPS were made available to the patient.      Additional E&M Note during same encounter follows:  Patient has multiple medical problems which are significant and separately identifiable that require additional work above and beyond the Medicare Wellness Visit.      Chief Complaint  Welcome To Medicare    Subjective        HPI  Sourav Proctor is also being seen today for HLD, OA, TREY, GERD. Takes simvastatin for hld. No chest pain, soa, olvera, orthopnea, lower extremity edema. Has neck pain with DDD in the cervical spine  "that is chronic. Has OA in joints. Meloxicam used. He has gerd well managed on prilosec and lifestyle mods. He follows with sleep medicine for TREY and uses cpap.          Objective   Vital Signs:  /72   Pulse 62   Resp 18   Ht 170.2 cm (67\")   Wt 74 kg (163 lb 3.2 oz)   SpO2 99%   BMI 25.56 kg/m²     Physical Exam  Constitutional:       Appearance: He is well-developed.   HENT:      Head: Normocephalic and atraumatic.      Right Ear: Tympanic membrane normal.      Left Ear: Tympanic membrane normal.      Nose: No congestion.      Mouth/Throat:      Pharynx: No oropharyngeal exudate.   Eyes:      Conjunctiva/sclera: Conjunctivae normal.      Pupils: Pupils are equal, round, and reactive to light.   Cardiovascular:      Rate and Rhythm: Normal rate and regular rhythm.      Heart sounds: No murmur heard.  Pulmonary:      Effort: Pulmonary effort is normal.      Breath sounds: Normal breath sounds.   Abdominal:      General: Bowel sounds are normal.      Palpations: Abdomen is soft.      Tenderness: There is no abdominal tenderness.   Musculoskeletal:         General: No deformity. Normal range of motion.      Cervical back: Normal range of motion and neck supple.   Lymphadenopathy:      Cervical: No cervical adenopathy.   Skin:     General: Skin is warm and dry.      Capillary Refill: Capillary refill takes less than 2 seconds.      Findings: No rash.   Neurological:      Mental Status: He is alert and oriented to person, place, and time.      Cranial Nerves: No cranial nerve deficit.      Coordination: Coordination normal.      Gait: Gait normal.   Psychiatric:         Behavior: Behavior normal.         Thought Content: Thought content normal.         Judgment: Judgment normal.          The following data was reviewed by: Celestine Payne PA-C on 06/10/2024:  Common labs          6/5/2024    09:09   Common Labs   Glucose 91    BUN 14    Creatinine 0.92    Sodium 140    Potassium 4.3    Chloride 104  "   Calcium 9.8    Albumin 4.3    Total Bilirubin 0.5    Alkaline Phosphatase 64    AST (SGOT) 15    ALT (SGPT) 16    WBC 5.87    Hemoglobin 14.2    Hematocrit 42.6    Platelets 259    Total Cholesterol 189    Triglycerides 154    HDL Cholesterol 50    LDL Cholesterol  112    Hemoglobin A1C 5.84        EKG: there are no previous tracings available for comparison, normal sinus rhythm, left axis deviation.      Data reviewed : Radiologic studies MRI         Assessment and Plan   Diagnoses and all orders for this visit:    1. Medicare annual wellness visit, initial (Primary)    2. Gastroesophageal reflux disease without esophagitis  Comments:  continue prilosec    3. Mixed hyperlipidemia  Comments:  lipid monitoring and continue simvastatin    4. Vitamin D deficiency  Comments:  vit d otc 2000 units. check d levels bi annually    5. TREY on CPAP  Comments:  continue sleep follow up with sleep medicine.    6. DDD (degenerative disc disease), cervical  Comments:  meloxicam.      Healthy eating habits. Low saturated fats. High plant based. Avoid processed foods. 15-30 min of cardiovascular exercise 5 days per week with atleast 2-3 days of resistance training.   Colonoscopy per GI.   Discussed Prevnar 20- reevaluate in 1 year.     I spent 45 minutes caring for Sourav on this date of service. This time includes time spent by me in the following activities:preparing for the visit, reviewing tests, obtaining and/or reviewing a separately obtained history, performing a medically appropriate examination and/or evaluation , counseling and educating the patient/family/caregiver, ordering medications, tests, or procedures, and documenting information in the medical record  Follow Up   Return in about 1 year (around 6/10/2025), or if symptoms worsen or fail to improve, for Medicare Wellness.  Patient was given instructions and counseling regarding his condition or for health maintenance advice. Please see specific information pulled  into the AVS if appropriate.

## 2024-07-09 ENCOUNTER — OFFICE VISIT (OUTPATIENT)
Dept: PAIN MEDICINE | Facility: CLINIC | Age: 69
End: 2024-07-09
Payer: MEDICARE

## 2024-07-09 VITALS
OXYGEN SATURATION: 98 % | WEIGHT: 161 LBS | HEART RATE: 65 BPM | RESPIRATION RATE: 16 BRPM | SYSTOLIC BLOOD PRESSURE: 119 MMHG | DIASTOLIC BLOOD PRESSURE: 73 MMHG | BODY MASS INDEX: 25.22 KG/M2

## 2024-07-09 DIAGNOSIS — M54.12 CERVICAL RADICULITIS: ICD-10-CM

## 2024-07-09 DIAGNOSIS — G89.4 CHRONIC PAIN SYNDROME: ICD-10-CM

## 2024-07-09 DIAGNOSIS — M47.812 CERVICAL SPONDYLOSIS WITHOUT MYELOPATHY: Primary | ICD-10-CM

## 2024-07-09 DIAGNOSIS — M79.18 MYOFASCIAL PAIN SYNDROME: ICD-10-CM

## 2024-07-09 PROCEDURE — 1125F AMNT PAIN NOTED PAIN PRSNT: CPT | Performed by: ANESTHESIOLOGY

## 2024-07-09 PROCEDURE — 99214 OFFICE O/P EST MOD 30 MIN: CPT | Performed by: ANESTHESIOLOGY

## 2024-07-09 RX ORDER — MELOXICAM 15 MG/1
15 TABLET ORAL DAILY PRN
Qty: 90 TABLET | Refills: 0 | Status: SHIPPED | OUTPATIENT
Start: 2024-07-09

## 2024-07-09 RX ORDER — GABAPENTIN 300 MG/1
300 CAPSULE ORAL NIGHTLY PRN
Qty: 90 CAPSULE | Refills: 1 | Status: SHIPPED | OUTPATIENT
Start: 2024-07-09

## 2024-07-09 NOTE — PROGRESS NOTES
Subjective   CC neck pain, headaches  Sourav Proctor is a 68 y.o. male with chronic neck pain here for follow-up.  Since last visit he has tried gabapentin and meloxicam as needed.  Reports good relief and significant improvement at nighttime.  Denies any new issues today.  Chronic neck pain described as constant axial neck pain radiating to both shoulders and to the head associated with cervicogenic headaches constant but worse with activity or neck movements.  Denies balance issues, bladder bowel incontinence.  He has tried physical therapy several times over the last 10 years without relief.  Currently trying home exercise program without relief.  Tried anti-inflammatories, Tylenol without relief.  Neck pain significantly impairing ADL and interfering with sleep.    Imaging reviewed  C-spine MRI : C7-T1: Suggested annular fissure in the posterior aspect of the disc. There is no definite disc herniation or interval foraminal stenosis. C5-6: Broad-based impression on the thecal sac which could reflect bulging of the annulus superimposed on more of an osseous bar. It suggested that there is some uncovertebral arthropathy. There is moderate narrowing of both imageable foramina.  Degenerative changes multiple level.    Pain Assessment   Location of Pain:  neck pain,   Description of Pain: Dull/Aching, Throbbing, Stabbing  Previous Pain Rating :3  Current Pain Ratin  Aggravating Factors: Activity  Alleviating Factors: Rest, Medication  Pain onset over 12 weeks  Interferes with ADL's.   [place] back pain disability scale on chart    PEG Assessment   What number best describes your pain on average in the past week?3  What number best describes how, during the past week, pain has interfered with your enjoyment of life?1  What number best describes how, during the past week, pain has interfered with your general activity?5    The following portions of the patient's history were reviewed and updated as appropriate:  allergies, current medications, past family history, past medical history, past social history, past surgical history and problem list.    Review of Systems   Musculoskeletal:  Positive for myalgias and neck pain.   Neurological:  Positive for headache.   All other systems reviewed and are negative.      Objective   Physical Exam  Vitals reviewed.   Constitutional:       General: He is not in acute distress.  Pulmonary:      Effort: Pulmonary effort is normal.   Musculoskeletal:      Cervical back: Tenderness present. Decreased range of motion.      Comments: Positive Spurling       /73 (BP Location: Left arm, Patient Position: Sitting, Cuff Size: Adult)   Pulse 65   Resp 16   Wt 73 kg (161 lb)   SpO2 98%   BMI 25.22 kg/m²     PHQ 9 on chart  Opioid risk tool low risk      Assessment & Plan   Diagnoses and all orders for this visit:    1. Cervical spondylosis without myelopathy (Primary)    2. Chronic pain syndrome  -     gabapentin (NEURONTIN) 300 MG capsule; Take 1 capsule by mouth At Night As Needed (pain).  Dispense: 90 capsule; Refill: 1  -     meloxicam (MOBIC) 15 MG tablet; Take 1 tablet by mouth Daily As Needed for Moderate Pain.  Dispense: 90 tablet; Refill: 0    3. Cervical radiculitis  -     gabapentin (NEURONTIN) 300 MG capsule; Take 1 capsule by mouth At Night As Needed (pain).  Dispense: 90 capsule; Refill: 1    4. Myofascial pain syndrome        Summary  Sourav Proctor is a 68 y.o. male with chronic neck pain here for follow-up.  Referred by his PCP.  Chronic and worsening neck pain with cervicogenic headaches.    Marginal relief with physical therapy, home exercise program and medications.  Pain interfering with ADL and sleep.    Since last visit he has tried gabapentin and meloxicam as needed.  Reports good relief and significant improvement at nighttime.  Denies any new issues today.    Continue gabapentin at bedtime and meloxicam as needed.  Repeat cervical SUZANNA as needed.    RTC 6 months  or as needed for procedure.          Note is dictated utilizing voice recognition software. Unfortunately this leads to occasional typographical errors. I apologize in advance if the situation occurs. If questions occur please do not hesitate to call our office.

## 2024-10-07 DIAGNOSIS — G89.4 CHRONIC PAIN SYNDROME: ICD-10-CM

## 2024-10-07 RX ORDER — MELOXICAM 15 MG/1
15 TABLET ORAL DAILY PRN
Qty: 90 TABLET | Refills: 0 | Status: SHIPPED | OUTPATIENT
Start: 2024-10-07

## 2024-11-01 ENCOUNTER — OFFICE VISIT (OUTPATIENT)
Dept: FAMILY MEDICINE CLINIC | Facility: CLINIC | Age: 69
End: 2024-11-01
Payer: MEDICARE

## 2024-11-01 VITALS
SYSTOLIC BLOOD PRESSURE: 124 MMHG | HEIGHT: 67 IN | HEART RATE: 61 BPM | OXYGEN SATURATION: 99 % | DIASTOLIC BLOOD PRESSURE: 78 MMHG | BODY MASS INDEX: 24.04 KG/M2 | RESPIRATION RATE: 18 BRPM | WEIGHT: 153.2 LBS

## 2024-11-01 DIAGNOSIS — R42 VERTIGO: ICD-10-CM

## 2024-11-01 DIAGNOSIS — R42 DIZZINESS: Primary | ICD-10-CM

## 2024-11-01 PROCEDURE — 1125F AMNT PAIN NOTED PAIN PRSNT: CPT | Performed by: INTERNAL MEDICINE

## 2024-11-01 PROCEDURE — 99213 OFFICE O/P EST LOW 20 MIN: CPT | Performed by: INTERNAL MEDICINE

## 2024-11-01 RX ORDER — CHLORHEXIDINE GLUCONATE ORAL RINSE 1.2 MG/ML
SOLUTION DENTAL
COMMUNITY
Start: 2024-10-21

## 2024-11-01 RX ORDER — MECLIZINE HYDROCHLORIDE 25 MG/1
25 TABLET ORAL 3 TIMES DAILY PRN
Qty: 30 TABLET | Refills: 0 | Status: SHIPPED | OUTPATIENT
Start: 2024-11-01

## 2024-11-01 NOTE — PATIENT INSTRUCTIONS
Meclizine 25 mg three times a day as needed    Stay well hydrated    Consider referral to PT if not improving    Follow up if new/worsening symptoms

## 2024-11-01 NOTE — PROGRESS NOTES
Chief Complaint  Dizziness    HPI:    Sourav Proctor presents to [facility]    Patient reports that about a years ago he had two or three tepisodes of dizziness, especially when rolling over. Spontaneoudlsy resolved until this morning when he had another episode when he got out of bed. Dizziness described as room spinning dizziness that lasted about 10-20 seconds. Symptoms worse with positional changes and improves with time and rest. Denies neurological symptoms including facial droop, slurred speech, focal sensory/motor deficit.  Denies headache, blurred vision, double vision, tinnitus, photophobia, phonophobia. Denies chest pain, shortness of breath, orthopnea, PND, lower extremity edema, palpitations, tachycardia, or syncope. Blood pressure overall recently good. Denies recent illness. Denies runny nose, sore throat, cough, congestion, ear pain/pressure, sinus pain/pressure. He does use hearing aides. Mood overall has been good. Denies new medications or changes in medications. Denies significant alcohol, tobacco, or recreational drug use. Denies recent significant blood loss, bruising. Staying well-hydrated.       Review of Systems:  ROS negative unless otherwise noted in HPI above.    Past Medical History:   Diagnosis Date    Allergic 11/2020    molds    Anemia, iron deficiency     Benign prostatic hyperplasia 04/05/2018    Bilateral presbyopia 12/06/2019    Cervical disc disorder MRI shows issues    Chronic pain disorder Neck pain    Combined forms of age-related cataract, bilateral 12/06/2019    DJD (degenerative joint disease) of cervical spine     Erectile dysfunction     Gastroesophageal reflux disease 10/24/2016    GERD (gastroesophageal reflux disease)     Hypercholesterolemia     Hyperlipidemia 04/25/2016    Inguinal lymphadenopathy 09/26/2017    Joint pain Ankle, neck    Keratitis 10/23/2017    Male erectile disorder (CODE) 04/25/2016    Neck pain MRI    Nuclear senile  "cataract of both eyes 12/06/2019    Other specified retinal disorders 12/06/2019    PVD (posterior vitreous detachment), right eye 12/06/2019    Sleep apnea, obstructive     Subcutaneous nodule 04/24/2017    Vitreous degeneration, bilateral 12/06/2019         Current Outpatient Medications:     Auvi-Q 0.3 MG/0.3ML solution auto-injector injection, INJECT AS NEEDED FOR SEVERE ALLERGIC REACTION INCLUDING ANAPHYLAXIS AS DIRECTED, Disp: , Rfl:     chlorhexidine (PERIDEX) 0.12 % solution, RINSE WITH 1/2OZ FOR 30 SECONDS TWICE DAILY, Disp: , Rfl:     Chondroitin Sulfate 400 MG capsule, , Disp: , Rfl:     Easy Touch Insulin Syringe 31G X 5/16\" 1 ML misc, See Admin Instructions., Disp: , Rfl:     gabapentin (NEURONTIN) 300 MG capsule, Take 1 capsule by mouth At Night As Needed (pain)., Disp: 90 capsule, Rfl: 1    Ibuprofen 3 %, Gabapentin 10 %, Baclofen 2 %, lidocaine 4 %, Ketamine HCl 4 %, Apply 1-2 g topically to the appropriate area as directed 3 (Three) to 4 (Four) times daily., Disp: 90 g, Rfl: 5    meloxicam (MOBIC) 15 MG tablet, TAKE 1 TABLET BY MOUTH DAILY AS NEEDED FOR MODERATE PAIN, Disp: 90 tablet, Rfl: 0    NON FORMULARY, Trimix 30mg/2mg/20mcg Per Ml Injection, Disp: , Rfl:     omeprazole (priLOSEC) 40 MG capsule, Take 1 capsule by mouth Daily., Disp: 90 capsule, Rfl: 3    simvastatin (ZOCOR) 10 MG tablet, Take 1 tablet by mouth Every Night., Disp: 90 tablet, Rfl: 3    meclizine (ANTIVERT) 25 MG tablet, Take 1 tablet by mouth 3 (Three) Times a Day As Needed for Dizziness., Disp: 30 tablet, Rfl: 0    Social History     Socioeconomic History    Marital status:     Number of children: 2   Tobacco Use    Smoking status: Never     Passive exposure: Never    Smokeless tobacco: Never   Vaping Use    Vaping status: Never Used   Substance and Sexual Activity    Alcohol use: Yes     Alcohol/week: 4.0 standard drinks of alcohol     Types: 4 Cans of beer per week    Drug use: No    Sexual activity: Yes     Partners: " "Female     Birth control/protection: Vasectomy, Surgical        Objective   Vital Signs:  /78   Pulse 61   Resp 18   Ht 170.2 cm (67\")   Wt 69.5 kg (153 lb 3.2 oz)   SpO2 99%   BMI 23.99 kg/m²   Estimated body mass index is 23.99 kg/m² as calculated from the following:    Height as of this encounter: 170.2 cm (67\").    Weight as of this encounter: 69.5 kg (153 lb 3.2 oz).    Physical Exam:  General: Well-appearing patient, no apparent distress  HEENT: No posterior pharynx erythema, no tonsillar erythema or exudates, normal external auditory canals, TM normal without bulging or erythema, no evidence of middle ear effusions, pupils equal round reactive light and accommodation, extraocular eye movements intact, no horizontal nystagmus  Cardiac: Regular rate and rhythm, normal S1/S2, no murmur, rubs or gallops, no lower extremity edema  Lungs: Clear to auscultation bilaterally, no crackles or wheezes  Skin: No significant rashes or lesions  MSK: Grossly normal tone and strength 5 out of 5 strength in upper and lower extremities bilaterally  Neuro: Alert and oriented x3, CN II-XII grossly intact, normal sensation in upper and lower extremities bilaterally  Psych: Appropriate mood and affect    Assessment and Plan:    (R42) Dizziness  (R42) Vertigo  Assessment: Patient with written spinning dizziness with positional changes consistent with vertigo.  No red flag symptoms.  Physical exam, including neurological exam, unremarkable.  Treating conservatively for BPPV.  Patient aware of signs and symptoms, which should prompt immediate reevaluation.  Plan:  - Meclizine 25 mg three times a day as needed  - Stay well hydrated  - Consider referral to PT if not improving  - Hold on imaging for now  - Follow up if new/worsening symptoms      Patient was given instructions and counseling regarding his condition or for health maintenance advice. Please see specific information pulled into the AVS if appropriate.       " Kwasi Lopez   Internal Medicine Physician  [facility]  [address], [address]  [place] [address]

## 2025-01-06 DIAGNOSIS — G89.4 CHRONIC PAIN SYNDROME: ICD-10-CM

## 2025-01-06 DIAGNOSIS — M54.12 CERVICAL RADICULITIS: ICD-10-CM

## 2025-01-07 RX ORDER — GABAPENTIN 300 MG/1
CAPSULE ORAL
Qty: 90 CAPSULE | Refills: 1 | OUTPATIENT
Start: 2025-01-07

## 2025-01-07 RX ORDER — MELOXICAM 15 MG/1
15 TABLET ORAL DAILY PRN
Qty: 90 TABLET | Refills: 0 | OUTPATIENT
Start: 2025-01-07

## 2025-01-13 ENCOUNTER — TELEPHONE (OUTPATIENT)
Dept: SLEEP MEDICINE | Facility: HOSPITAL | Age: 70
End: 2025-01-13
Payer: MEDICARE

## 2025-01-14 ENCOUNTER — TELEPHONE (OUTPATIENT)
Dept: SLEEP MEDICINE | Facility: HOSPITAL | Age: 70
End: 2025-01-14
Payer: MEDICARE

## 2025-01-14 NOTE — TELEPHONE ENCOUNTER
Patient left message to reschedule appointment.  Called patient back and had to leave message for him to call us to reschedule.

## 2025-01-21 ENCOUNTER — PATIENT MESSAGE (OUTPATIENT)
Dept: PAIN MEDICINE | Facility: CLINIC | Age: 70
End: 2025-01-21
Payer: MEDICARE

## 2025-01-21 DIAGNOSIS — G89.4 CHRONIC PAIN SYNDROME: ICD-10-CM

## 2025-01-21 DIAGNOSIS — M54.12 CERVICAL RADICULITIS: ICD-10-CM

## 2025-01-21 RX ORDER — GABAPENTIN 300 MG/1
300 CAPSULE ORAL NIGHTLY PRN
Qty: 90 CAPSULE | Refills: 1 | Status: SHIPPED | OUTPATIENT
Start: 2025-01-21

## 2025-01-30 DIAGNOSIS — K21.9 GASTROESOPHAGEAL REFLUX DISEASE WITHOUT ESOPHAGITIS: ICD-10-CM

## 2025-01-30 RX ORDER — OMEPRAZOLE 40 MG/1
40 CAPSULE, DELAYED RELEASE ORAL DAILY
Qty: 90 CAPSULE | Refills: 1 | Status: SHIPPED | OUTPATIENT
Start: 2025-01-30

## 2025-02-18 ENCOUNTER — OFFICE VISIT (OUTPATIENT)
Dept: PAIN MEDICINE | Facility: CLINIC | Age: 70
End: 2025-02-18
Payer: MEDICARE

## 2025-02-18 VITALS
WEIGHT: 156 LBS | RESPIRATION RATE: 16 BRPM | HEART RATE: 72 BPM | SYSTOLIC BLOOD PRESSURE: 124 MMHG | BODY MASS INDEX: 24.43 KG/M2 | DIASTOLIC BLOOD PRESSURE: 82 MMHG | OXYGEN SATURATION: 99 %

## 2025-02-18 DIAGNOSIS — M47.812 CERVICAL SPONDYLOSIS WITHOUT MYELOPATHY: Primary | ICD-10-CM

## 2025-02-18 DIAGNOSIS — M79.18 MYOFASCIAL PAIN SYNDROME: ICD-10-CM

## 2025-02-18 DIAGNOSIS — M54.12 CERVICAL RADICULITIS: ICD-10-CM

## 2025-02-18 DIAGNOSIS — G89.4 CHRONIC PAIN SYNDROME: ICD-10-CM

## 2025-02-18 PROCEDURE — 1125F AMNT PAIN NOTED PAIN PRSNT: CPT | Performed by: ANESTHESIOLOGY

## 2025-02-18 PROCEDURE — 1159F MED LIST DOCD IN RCRD: CPT | Performed by: ANESTHESIOLOGY

## 2025-02-18 PROCEDURE — 99214 OFFICE O/P EST MOD 30 MIN: CPT | Performed by: ANESTHESIOLOGY

## 2025-02-18 PROCEDURE — G2211 COMPLEX E/M VISIT ADD ON: HCPCS | Performed by: ANESTHESIOLOGY

## 2025-02-18 PROCEDURE — 1160F RVW MEDS BY RX/DR IN RCRD: CPT | Performed by: ANESTHESIOLOGY

## 2025-02-18 RX ORDER — GABAPENTIN 300 MG/1
300 CAPSULE ORAL NIGHTLY PRN
Qty: 90 CAPSULE | Refills: 5 | Status: SHIPPED | OUTPATIENT
Start: 2025-02-18

## 2025-02-18 RX ORDER — MELOXICAM 15 MG/1
15 TABLET ORAL DAILY PRN
Qty: 90 TABLET | Refills: 0 | Status: SHIPPED | OUTPATIENT
Start: 2025-02-18

## 2025-02-18 NOTE — PROGRESS NOTES
Subjective   CC neck pain, headaches  Sourav Proctor is a 69 y.o. male with chronic neck pain here for follow-up.  Stable with no new issues.  Continues to have good relief with gabapentin and meloxicam as needed.  Chronic neck pain described as constant axial neck pain radiating to both shoulders and to the head associated with cervicogenic headaches constant but worse with activity or neck movements.  Denies balance issues, bladder bowel incontinence.  He has tried physical therapy several times over the last 10 years without relief.  Currently trying home exercise program without relief.  Tried anti-inflammatories, Tylenol without relief.  Neck pain significantly impairing ADL and interfering with sleep.    Imaging reviewed  C-spine MRI : C7-T1: Suggested annular fissure in the posterior aspect of the disc. There is no definite disc herniation or interval foraminal stenosis. C5-6: Broad-based impression on the thecal sac which could reflect bulging of the annulus superimposed on more of an osseous bar. It suggested that there is some uncovertebral arthropathy. There is moderate narrowing of both imageable foramina.  Degenerative changes multiple level.    Pain Assessment   Location of Pain:  neck pain,   Description of Pain: Dull/Aching, Throbbing, Stabbing  Previous Pain Rating :2  Current Pain Ratin  Aggravating Factors: Activity  Alleviating Factors: Rest, Medication  Pain onset over 12 weeks  Interferes with ADL's.   [place] back pain disability scale on chart    PEG Assessment   What number best describes your pain on average in the past week?3  What number best describes how, during the past week, pain has interfered with your enjoyment of life?1  What number best describes how, during the past week, pain has interfered with your general activity?6    The following portions of the patient's history were reviewed and updated as appropriate: allergies, current medications, past family history, past  medical history, past social history, past surgical history and problem list.    Review of Systems   Musculoskeletal:  Positive for myalgias and neck pain.   Neurological:  Positive for headache.   All other systems reviewed and are negative.      Objective   Physical Exam  Vitals reviewed.   Constitutional:       General: He is not in acute distress.  Pulmonary:      Effort: Pulmonary effort is normal.   Musculoskeletal:      Cervical back: Tenderness present. Decreased range of motion.      Comments: Positive Spurling       /82 (BP Location: Left arm, Patient Position: Sitting, Cuff Size: Adult)   Pulse 72   Resp 16   Wt 70.8 kg (156 lb)   SpO2 99%   BMI 24.43 kg/m²     PHQ 9 on chart  Opioid risk tool low risk      Assessment & Plan   Diagnoses and all orders for this visit:    1. Cervical spondylosis without myelopathy (Primary)    2. Chronic pain syndrome  -     gabapentin (NEURONTIN) 300 MG capsule; Take 1 capsule by mouth At Night As Needed (pain).  Dispense: 90 capsule; Refill: 5  -     meloxicam (MOBIC) 15 MG tablet; Take 1 tablet by mouth Daily As Needed for Moderate Pain.  Dispense: 90 tablet; Refill: 0    3. Cervical radiculitis  -     gabapentin (NEURONTIN) 300 MG capsule; Take 1 capsule by mouth At Night As Needed (pain).  Dispense: 90 capsule; Refill: 5    4. Myofascial pain syndrome        Summary  Sourav Proctor is a 69 y.o. male with chronic neck pain here for follow-up.  Referred by his PCP.  Chronic and worsening neck pain with cervicogenic headaches.    Marginal relief with physical therapy, home exercise program and medications.  Pain interfering with ADL and sleep.  70% relief with cervical SUZANNA lasting several months.    Stable with no new issues.  Continues to have good relief with gabapentin and meloxicam as needed.  Repeat cervical SUZANNA as needed.    RTC 6 months or as needed for procedure.          Note is dictated utilizing voice recognition software. Unfortunately this leads to  occasional typographical errors. I apologize in advance if the situation occurs. If questions occur please do not hesitate to call our office.

## 2025-04-02 DIAGNOSIS — E78.2 MIXED HYPERLIPIDEMIA: ICD-10-CM

## 2025-04-02 RX ORDER — SIMVASTATIN 10 MG
10 TABLET ORAL NIGHTLY
Qty: 90 TABLET | Refills: 3 | Status: SHIPPED | OUTPATIENT
Start: 2025-04-02

## 2025-05-05 ENCOUNTER — OFFICE VISIT (OUTPATIENT)
Dept: FAMILY MEDICINE CLINIC | Facility: CLINIC | Age: 70
End: 2025-05-05
Payer: MEDICARE

## 2025-05-05 VITALS
HEART RATE: 80 BPM | OXYGEN SATURATION: 99 % | DIASTOLIC BLOOD PRESSURE: 68 MMHG | BODY MASS INDEX: 24.8 KG/M2 | SYSTOLIC BLOOD PRESSURE: 118 MMHG | HEIGHT: 67 IN | RESPIRATION RATE: 14 BRPM | WEIGHT: 158 LBS

## 2025-05-05 DIAGNOSIS — H81.13 BENIGN PAROXYSMAL POSITIONAL VERTIGO DUE TO BILATERAL VESTIBULAR DISORDER: Primary | ICD-10-CM

## 2025-05-05 PROCEDURE — 1125F AMNT PAIN NOTED PAIN PRSNT: CPT | Performed by: PHYSICIAN ASSISTANT

## 2025-05-05 PROCEDURE — 99213 OFFICE O/P EST LOW 20 MIN: CPT | Performed by: PHYSICIAN ASSISTANT

## 2025-05-06 ENCOUNTER — OFFICE VISIT (OUTPATIENT)
Dept: SLEEP MEDICINE | Facility: CLINIC | Age: 70
End: 2025-05-06
Payer: MEDICARE

## 2025-05-06 VITALS
SYSTOLIC BLOOD PRESSURE: 108 MMHG | OXYGEN SATURATION: 98 % | DIASTOLIC BLOOD PRESSURE: 60 MMHG | BODY MASS INDEX: 23.86 KG/M2 | WEIGHT: 152 LBS | HEART RATE: 66 BPM | HEIGHT: 67 IN

## 2025-05-06 DIAGNOSIS — K21.9 GASTROESOPHAGEAL REFLUX DISEASE, UNSPECIFIED WHETHER ESOPHAGITIS PRESENT: ICD-10-CM

## 2025-05-06 DIAGNOSIS — G47.19 EXCESSIVE DAYTIME SLEEPINESS: ICD-10-CM

## 2025-05-06 DIAGNOSIS — G47.33 OSA ON CPAP: Primary | ICD-10-CM

## 2025-05-06 PROCEDURE — G0463 HOSPITAL OUTPT CLINIC VISIT: HCPCS

## 2025-05-06 NOTE — PROGRESS NOTES
"Subjective   Sourav Proctor is a 69 y.o. male.     Chief Complaint   Patient presents with    Dizziness     Hx of dizziness. Has been happening more often. Had a 6 hr bout on Saturday 5/3/25 vomiting.        /68 (BP Location: Right arm, Patient Position: Sitting, Cuff Size: Adult)   Pulse 80   Resp 14   Ht 170.2 cm (67\")   Wt 71.7 kg (158 lb)   SpO2 99%   BMI 24.75 kg/m²     BP Readings from Last 3 Encounters:   05/05/25 118/68   02/18/25 124/82   11/01/24 124/78       Wt Readings from Last 3 Encounters:   05/05/25 71.7 kg (158 lb)   02/18/25 70.8 kg (156 lb)   11/01/24 69.5 kg (153 lb 3.2 oz)       Dizziness   Presents to the clinic for intermittent dizziness. He has a history of BPPV. He has struggled with this intermittently. He has seen ENT for this and states that he had his ears cleaned out and he is wearing hearing aids. He has recently travelled out to [place] and then he travelled to [place]. He states that when he got to [place] he felt fine but toward the end of the trip he started having the dizziness. It was worse when lying in his bed or moving his head in certain directions. He would feel nauesous when this happened. He would not have any headaches associated with this. He has meclizine but doesn't typically use this. He has not had PT evaluation at this time. No ataxia.     The following portions of the patient's history were reviewed and updated as appropriate: allergies, current medications, past family history, past medical history, past social history, past surgical history, and problem list.    Review of Systems   Neurological:  Positive for dizziness.       Objective   Physical Exam  Constitutional:       Appearance: Normal appearance.   HENT:      Right Ear: Tympanic membrane normal.      Left Ear: Tympanic membrane normal.   Eyes:      Extraocular Movements: Extraocular movements intact.      Pupils: Pupils are equal, round, and reactive to light.   Cardiovascular:      Rate and " Rhythm: Normal rate.      Heart sounds: No murmur heard.  Pulmonary:      Effort: Pulmonary effort is normal.      Breath sounds: No wheezing.   Neurological:      General: No focal deficit present.      Mental Status: He is alert and oriented to person, place, and time.   Psychiatric:         Mood and Affect: Mood normal.         Behavior: Behavior normal.           Diagnoses and all orders for this visit:    1. Benign paroxysmal positional vertigo due to bilateral vestibular disorder (Primary)  -     Ambulatory Referral to Physical Therapy for Evaluation & Treatment      At this point I think we have him see PT for treatment for bppv. We discussed doing potential imaging of the head but without any red flag sx at this time we will hold off. We are going to look for common triggers- I think alcohol could be a contributing trigger for him so he will pay attention to this. We discussed possibly trying treatment with triamterene/hctz but I do not think his bp will tolerate this.     No follow-ups on file.

## 2025-05-06 NOTE — PROGRESS NOTES
"  [facility]  Sleep medicine  [facility], [address]  [place] [address]  Phone   Fax       SLEEP CLINIC FOLLOW UP PROGRESS NOTE.    Sourav Proctor  [id number]   1955  69 y.o.  male      PCP: Celestine Payne PA-C      Date of visit: 5/6/2025    Chief Complaint   Patient presents with    Follow-up    Sleep Apnea       HPI:  This is a 69 y.o. years old patient is here for the management of obstructive sleep apnea.  Sleep apnea is mild to moderate in severity with a AHI of 6/h and in supine 15/hr. Patient is using positive airway pressure therapy with auto CPAP and the symptoms of sleep apnea have improved significantly on the therapy. Normally patient goes to bed at 10 PM and wakes up at 5 AM .  The patient wakes up 2-3 time(s) during the night and has no problem going back to sleep.  Feels refreshed after waking up.    Without the CPAP patient has daytime excessive sleepiness.  Medications and allergies are reviewed by me and documented in the encounter.     SOCIAL (habits pertaining to sleep medicine)  History tobacco use:No   History of alcohol use: 4 per week  Caffeine use: 4     REVIEW OF SYSTEMS:   Pertaining positive symptoms are:  [place] Sleepiness Scale :Total score: 6   GERD       PHYSICAL EXAMINATION:  CONSTITUTIONAL:  Vitals:    05/06/25 1100   BP: 108/60   BP Location: Left arm   Patient Position: Sitting   Pulse: 66   SpO2: 98%   Weight: 68.9 kg (152 lb)   Height: 170.2 cm (67\")    Body mass index is 23.81 kg/m².   NOSE: nasal passages are clear, No deformities noted   RESP SYSTEM: Not in any respiratory distress, no chest deformities noted,   CARDIOVASULAR: No edema noted  NEURO: Oriented x 3, gait normal,  Mood and affect appeared appropriate      Data reviewed:  The Smart card downloaded on 5/6/2025 has been reviewed independently by me for compliance and discussed the data with the patient.   Compliance; 100%  More than 4 hr use, " 100%  Average use of the device 7 hours and 11 minutes per night  Residual AHI: 4.5 /hr (goal < 5.0 /hr)  Mask type: Fullface mask  Device: DreamStation 2  DME: [facility]      ASSESSMENT AND PLAN:  Obstructive sleep apnea ( G 47.33).  The symptoms of sleep apnea have improved with the device and the treatment.  Patient's compliance with the device is excellent for treatment of sleep apnea.  I have independently reviewed the smart card down load and discussed with the patient the download data and encouarged the patient to continue to use the device.The residual AHI is acceptable. The device is benefiting the patient and the device is medically necessary.  Without proper control of sleep apnea and good compliance there is a increased risk for hypertension, diabetes mellitus and nonrestorative sleep with hypersomnia which can increase risk for motor vehicle accidents.  Untreated sleep apnea is also a risk factor for development of atrial fibrillation, pulmonary hypertension, insulin resistance and stroke.  Patient is eligible for a new CPAP.  I have sent an order to [facility] to set him up with a new auto CPAP and see me back in 31 to 90 days for compliance.  The CPAP is benefiting the patient and it is medically necessary.  Without the CPAP patient has daytime excessive sleepiness and the patient is using the CPAP for more than 6 years and his symptoms are well-controlled.    Daytime excessive sleepiness..  Improved with the CPAP  Return for 31 to 90 days after PAP setup with down load. . Patient's questions were answered.    5/6/2025  Anastasia Handy MD  Sleep Medicine.  Medical Director,   [facility], [facility] sleep centers.

## 2025-05-30 DIAGNOSIS — Z00.00 ENCOUNTER FOR MEDICARE ANNUAL WELLNESS EXAM: ICD-10-CM

## 2025-05-30 DIAGNOSIS — R42 DIZZINESS: Primary | ICD-10-CM

## 2025-05-30 DIAGNOSIS — E78.2 MIXED HYPERLIPIDEMIA: ICD-10-CM

## 2025-05-30 DIAGNOSIS — Z12.5 SCREENING PSA (PROSTATE SPECIFIC ANTIGEN): ICD-10-CM

## 2025-05-30 DIAGNOSIS — R79.9 ABNORMAL FINDING OF BLOOD CHEMISTRY, UNSPECIFIED: ICD-10-CM

## 2025-05-30 DIAGNOSIS — Z00.00 ANNUAL PHYSICAL EXAM: ICD-10-CM

## 2025-06-05 ENCOUNTER — LAB (OUTPATIENT)
Dept: FAMILY MEDICINE CLINIC | Facility: CLINIC | Age: 70
End: 2025-06-05
Payer: MEDICARE

## 2025-06-05 DIAGNOSIS — R42 DIZZINESS: ICD-10-CM

## 2025-06-05 DIAGNOSIS — Z00.00 ANNUAL PHYSICAL EXAM: ICD-10-CM

## 2025-06-05 DIAGNOSIS — Z00.00 ENCOUNTER FOR MEDICARE ANNUAL WELLNESS EXAM: ICD-10-CM

## 2025-06-05 DIAGNOSIS — E78.2 MIXED HYPERLIPIDEMIA: ICD-10-CM

## 2025-06-05 DIAGNOSIS — Z12.5 SCREENING PSA (PROSTATE SPECIFIC ANTIGEN): ICD-10-CM

## 2025-06-05 DIAGNOSIS — R79.9 ABNORMAL FINDING OF BLOOD CHEMISTRY, UNSPECIFIED: ICD-10-CM

## 2025-06-05 LAB
ALBUMIN SERPL-MCNC: 4.4 G/DL (ref 3.5–5.2)
ALBUMIN/GLOB SERPL: 1.7 G/DL
ALP SERPL-CCNC: 68 U/L (ref 39–117)
ALT SERPL W P-5'-P-CCNC: 14 U/L (ref 1–41)
ANION GAP SERPL CALCULATED.3IONS-SCNC: 11 MMOL/L (ref 5–15)
AST SERPL-CCNC: 18 U/L (ref 1–40)
BASOPHILS # BLD AUTO: 0.03 10*3/MM3 (ref 0–0.2)
BASOPHILS NFR BLD AUTO: 0.6 % (ref 0–1.5)
BILIRUB SERPL-MCNC: 0.5 MG/DL (ref 0–1.2)
BUN SERPL-MCNC: 16 MG/DL (ref 8–23)
BUN/CREAT SERPL: 13.8 (ref 7–25)
CALCIUM SPEC-SCNC: 9.9 MG/DL (ref 8.6–10.5)
CHLORIDE SERPL-SCNC: 104 MMOL/L (ref 98–107)
CHOLEST SERPL-MCNC: 205 MG/DL (ref 0–200)
CO2 SERPL-SCNC: 27 MMOL/L (ref 22–29)
CREAT SERPL-MCNC: 1.16 MG/DL (ref 0.76–1.27)
DEPRECATED RDW RBC AUTO: 42.6 FL (ref 37–54)
EGFRCR SERPLBLD CKD-EPI 2021: 68.6 ML/MIN/1.73
EOSINOPHIL # BLD AUTO: 0.11 10*3/MM3 (ref 0–0.4)
EOSINOPHIL NFR BLD AUTO: 2.2 % (ref 0.3–6.2)
ERYTHROCYTE [DISTWIDTH] IN BLOOD BY AUTOMATED COUNT: 12.4 % (ref 12.3–15.4)
GLOBULIN UR ELPH-MCNC: 2.6 GM/DL
GLUCOSE SERPL-MCNC: 106 MG/DL (ref 65–99)
HBA1C MFR BLD: 5.4 % (ref 4.8–5.6)
HCT VFR BLD AUTO: 44 % (ref 37.5–51)
HDLC SERPL-MCNC: 53 MG/DL (ref 40–60)
HGB BLD-MCNC: 14.9 G/DL (ref 13–17.7)
IMM GRANULOCYTES # BLD AUTO: 0.02 10*3/MM3 (ref 0–0.05)
IMM GRANULOCYTES NFR BLD AUTO: 0.4 % (ref 0–0.5)
LDLC SERPL CALC-MCNC: 135 MG/DL (ref 0–100)
LDLC/HDLC SERPL: 2.5 {RATIO}
LYMPHOCYTES # BLD AUTO: 1.46 10*3/MM3 (ref 0.7–3.1)
LYMPHOCYTES NFR BLD AUTO: 28.6 % (ref 19.6–45.3)
MCH RBC QN AUTO: 32 PG (ref 26.6–33)
MCHC RBC AUTO-ENTMCNC: 33.9 G/DL (ref 31.5–35.7)
MCV RBC AUTO: 94.4 FL (ref 79–97)
MONOCYTES # BLD AUTO: 0.54 10*3/MM3 (ref 0.1–0.9)
MONOCYTES NFR BLD AUTO: 10.6 % (ref 5–12)
NEUTROPHILS NFR BLD AUTO: 2.95 10*3/MM3 (ref 1.7–7)
NEUTROPHILS NFR BLD AUTO: 57.6 % (ref 42.7–76)
NRBC BLD AUTO-RTO: 0 /100 WBC (ref 0–0.2)
PLATELET # BLD AUTO: 271 10*3/MM3 (ref 140–450)
PMV BLD AUTO: 10.5 FL (ref 6–12)
POTASSIUM SERPL-SCNC: 4.5 MMOL/L (ref 3.5–5.2)
PROT SERPL-MCNC: 7 G/DL (ref 6–8.5)
PSA SERPL-MCNC: 0.81 NG/ML (ref 0–4)
RBC # BLD AUTO: 4.66 10*6/MM3 (ref 4.14–5.8)
SODIUM SERPL-SCNC: 142 MMOL/L (ref 136–145)
TRIGL SERPL-MCNC: 97 MG/DL (ref 0–150)
TSH SERPL DL<=0.05 MIU/L-ACNC: 1.59 UIU/ML (ref 0.27–4.2)
VLDLC SERPL-MCNC: 17 MG/DL (ref 5–40)
WBC NRBC COR # BLD AUTO: 5.11 10*3/MM3 (ref 3.4–10.8)

## 2025-06-05 PROCEDURE — 84443 ASSAY THYROID STIM HORMONE: CPT | Performed by: PHYSICIAN ASSISTANT

## 2025-06-05 PROCEDURE — 80061 LIPID PANEL: CPT | Performed by: PHYSICIAN ASSISTANT

## 2025-06-05 PROCEDURE — G0103 PSA SCREENING: HCPCS | Performed by: PHYSICIAN ASSISTANT

## 2025-06-05 PROCEDURE — 80053 COMPREHEN METABOLIC PANEL: CPT | Performed by: PHYSICIAN ASSISTANT

## 2025-06-05 PROCEDURE — 83036 HEMOGLOBIN GLYCOSYLATED A1C: CPT | Performed by: PHYSICIAN ASSISTANT

## 2025-06-05 PROCEDURE — 85025 COMPLETE CBC W/AUTO DIFF WBC: CPT | Performed by: PHYSICIAN ASSISTANT

## 2025-06-05 PROCEDURE — 36415 COLL VENOUS BLD VENIPUNCTURE: CPT

## 2025-06-17 ENCOUNTER — OFFICE VISIT (OUTPATIENT)
Dept: FAMILY MEDICINE CLINIC | Facility: CLINIC | Age: 70
End: 2025-06-17
Payer: MEDICARE

## 2025-06-17 VITALS
OXYGEN SATURATION: 99 % | HEIGHT: 67 IN | WEIGHT: 156.8 LBS | DIASTOLIC BLOOD PRESSURE: 62 MMHG | RESPIRATION RATE: 16 BRPM | HEART RATE: 64 BPM | BODY MASS INDEX: 24.61 KG/M2 | SYSTOLIC BLOOD PRESSURE: 110 MMHG

## 2025-06-17 DIAGNOSIS — Z12.11 ENCOUNTER FOR SCREENING FOR MALIGNANT NEOPLASM OF COLON: ICD-10-CM

## 2025-06-17 DIAGNOSIS — K21.9 GASTROESOPHAGEAL REFLUX DISEASE, UNSPECIFIED WHETHER ESOPHAGITIS PRESENT: ICD-10-CM

## 2025-06-17 DIAGNOSIS — H81.10 BENIGN PAROXYSMAL POSITIONAL VERTIGO, UNSPECIFIED LATERALITY: ICD-10-CM

## 2025-06-17 DIAGNOSIS — Z00.00 MEDICARE ANNUAL WELLNESS VISIT, SUBSEQUENT: Primary | ICD-10-CM

## 2025-06-17 DIAGNOSIS — E78.2 MIXED HYPERLIPIDEMIA: ICD-10-CM

## 2025-06-17 NOTE — PROGRESS NOTES
" Subjective   The ABCs of the Annual Wellness Visit  Medicare Wellness Visit      Sourav Proctor is a 69 y.o. patient who presents for a Medicare Wellness Visit.    The following portions of the patient's history were reviewed and   updated as appropriate: allergies, current medications, past family history, past medical history, past social history, past surgical history, and problem list.    Compared to one year ago, the patient's physical   health is the same.  Compared to one year ago, the patient's mental   health is the same.    Recent Hospitalizations:  He was not admitted to the hospital during the last year.     Current Medical Providers:  Patient Care Team:  Celestine Payne PA-C as PCP - General (Physician Assistant)  Dr. العلي ([facility])  Dr. Wiseman ([facility]) ([facility] Ophthalmology)    Outpatient Medications Prior to Visit   Medication Sig Dispense Refill    Auvi-Q 0.3 MG/0.3ML solution auto-injector injection INJECT AS NEEDED FOR SEVERE ALLERGIC REACTION INCLUDING ANAPHYLAXIS AS DIRECTED      Chondroitin Sulfate 400 MG capsule       Easy Touch Insulin Syringe 31G X 5/16\" 1 ML misc See Admin Instructions.      gabapentin (NEURONTIN) 300 MG capsule Take 1 capsule by mouth At Night As Needed (pain). 90 capsule 5    meclizine (ANTIVERT) 25 MG tablet Take 1 tablet by mouth 3 (Three) Times a Day As Needed for Dizziness. 30 tablet 0    meloxicam (MOBIC) 15 MG tablet Take 1 tablet by mouth Daily As Needed for Moderate Pain. 90 tablet 0    NON FORMULARY Trimix 30mg/2mg/20mcg Per Ml Injection      omeprazole (priLOSEC) 40 MG capsule TAKE 1 CAPSULE DAILY 90 capsule 1    simvastatin (ZOCOR) 10 MG tablet TAKE 1 TABLET EVERY NIGHT 90 tablet 3    chlorhexidine (PERIDEX) 0.12 % solution RINSE WITH 1/2OZ FOR 30 SECONDS TWICE DAILY       No facility-administered medications prior to visit.     No opioid medication identified on active medication list. I have reviewed chart for other potential  " "high risk medication/s and harmful drug interactions in the elderly.      Aspirin is not on active medication list.  Aspirin use is not indicated based on review of current medical condition/s. Risk of harm outweighs potential benefits.  .    Patient Active Problem List   Diagnosis    Bilateral presbyopia    Nuclear senile cataract of both eyes    Combined forms of age-related cataract, bilateral    Encounter for general adult medical examination without abnormal findings    Gastroesophageal reflux disease    Mixed hyperlipidemia    Male erectile disorder (CODE)    Other specified postprocedural states    PVD (posterior vitreous detachment), right eye    Vitreous degeneration, bilateral    TREY on CPAP    Gout of left foot    Vitreous degeneration    Annual physical exam    Tinnitus of both ears    Benign essential tremor    Cervicalgia    Iron deficiency anemia    Vitamin D deficiency    DDD (degenerative disc disease), cervical    Excessive daytime sleepiness     Advance Care Planning Advance Directive is on file.  ACP discussion was held with the patient during this visit. Patient has an advance directive in EMR which is still valid.             Objective   Vitals:    06/17/25 1426   BP: 110/62   Pulse: 64   Resp: 16   SpO2: 99%   Weight: 71.1 kg (156 lb 12.8 oz)   Height: 170.2 cm (67\")   PainSc: 0-No pain       Estimated body mass index is 24.56 kg/m² as calculated from the following:    Height as of this encounter: 170.2 cm (67\").    Weight as of this encounter: 71.1 kg (156 lb 12.8 oz).    BMI is within normal parameters. No other follow-up for BMI required.             Does the patient have evidence of cognitive impairment? No  Lab Results   Component Value Date    TRIG 97 06/05/2025    HDL 53 06/05/2025     (H) 06/05/2025    VLDL 17 06/05/2025    HGBA1C 5.40 06/05/2025                                                                                                Health  Risk Assessment    Smoking " Status:  Social History     Tobacco Use   Smoking Status Never    Passive exposure: Never   Smokeless Tobacco Never     Alcohol Consumption:  Social History     Substance and Sexual Activity   Alcohol Use Yes    Alcohol/week: 6.0 standard drinks of alcohol    Types: 6 Cans of beer per week       Fall Risk Screen  STEADI Fall Risk Assessment was completed, and patient is at LOW risk for falls.Assessment completed on:2025    Depression Screening   Little interest or pleasure in doing things? Not at all   Feeling down, depressed, or hopeless? Not at all   PHQ-2 Total Score 0      Health Habits and Functional and Cognitive Screenin/12/2025     7:46 AM   Functional & Cognitive Status   Do you have difficulty preparing food and eating? No   Do you have difficulty bathing yourself, getting dressed or grooming yourself? No   Do you have difficulty using the toilet? No   Do you have difficulty moving around from place to place? No   Do you have trouble with steps or getting out of a bed or a chair? No   Current Diet Well Balanced Diet   Dental Exam Up to date   Eye Exam Up to date   Exercise (times per week) 3 times per week   Current Exercises Include Pickleball;Yard Work   Do you need help using the phone?  No   Are you deaf or do you have serious difficulty hearing?  No   Do you need help to go to places out of walking distance? No   Do you need help shopping? No   Do you need help preparing meals?  No   Do you need help with housework?  No   Do you need help with laundry? No   Do you need help taking your medications? No   Do you need help managing money? No   Do you ever drive or ride in a car without wearing a seat belt? No   Have you felt unusual fatigue (could be tiredness), stress, anger or loneliness in the last month? No    Who do you live with? Spouse   If you need help, do you have trouble finding someone available to you? No   Have you been bothered in the last four weeks by sexual problems? No    Do you have difficulty concentrating, remembering or making decisions? No       Data saved with a previous flowsheet row definition           Age-appropriate Screening Schedule:  Refer to the list below for future screening recommendations based on patient's age, sex and/or medical conditions. Orders for these recommended tests are listed in the plan section. The patient has been provided with a written plan.    Health Maintenance List  Health Maintenance   Topic Date Due    INFLUENZA VACCINE  07/01/2025    COLORECTAL CANCER SCREENING  11/09/2025    COVID-19 Vaccine (8 - 2024-25 season) 07/01/2025 (Originally 4/25/2025)    LIPID PANEL  06/05/2026    ANNUAL WELLNESS VISIT  06/17/2026    TDAP/TD VACCINES (2 - Tdap) 07/01/2030    Pneumococcal Vaccine 50+  Completed    ZOSTER VACCINE  Completed    HEPATITIS C SCREENING  Addressed                                                                                                                                                CMS Preventative Services Quick Reference  Risk Factors Identified During Encounter  Immunizations Discussed/Encouraged: Influenza  Dental Screening Recommended  Vision Screening Recommended    The above risks/problems have been discussed with the patient.  Pertinent information has been shared with the patient in the After Visit Summary.  An After Visit Summary and PPPS were made available to the patient.    Follow Up:   Next Medicare Wellness visit to be scheduled in 1 year.         Additional E&M Note during same encounter follows:  Patient has additional, significant, and separately identifiable condition(s)/problem(s) that require work above and beyond the Medicare Wellness Visit     Chief Complaint  Medicare Wellness-subsequent    Subjective   HPI  Dereje is also being seen today for additional medical problem/s.    BPPV, GERD, hyperlipidemia.  Has had intermittent dizziness on and off now for quite some time.  This seems to be potentially  "associated with alcohol use and dehydration.  Typically will go away on its own.  Denies having any constant symptoms.  He does have tinnitus and bilateral hearing loss.  No formal diagnosis of Ménière's disease in the past.  Blood pressure is on the low side so is not currently on anything for trial to treat Ménière's.  GERD is controlled on omeprazole and hyperlipidemia has been well managed on simvastatin.  No dysphagia or increased reflux.  No muscle aches, urine color changes, or joint pain on simvastatin.  Follows with pain management as well and is prescribed meloxicam as well as Neurontin.              Objective   Vital Signs:  /62   Pulse 64   Resp 16   Ht 170.2 cm (67\")   Wt 71.1 kg (156 lb 12.8 oz)   SpO2 99%   BMI 24.56 kg/m²   Physical Exam  Constitutional:       Appearance: He is well-developed.   HENT:      Head: Normocephalic and atraumatic.      Right Ear: Tympanic membrane normal.      Left Ear: Tympanic membrane normal.      Nose: No congestion.      Mouth/Throat:      Pharynx: No oropharyngeal exudate.   Eyes:      Conjunctiva/sclera: Conjunctivae normal.      Pupils: Pupils are equal, round, and reactive to light.   Cardiovascular:      Rate and Rhythm: Normal rate and regular rhythm.      Heart sounds: No murmur heard.  Pulmonary:      Effort: Pulmonary effort is normal.      Breath sounds: Normal breath sounds.   Abdominal:      General: Bowel sounds are normal.      Palpations: Abdomen is soft.   Musculoskeletal:         General: No deformity. Normal range of motion.      Cervical back: Normal range of motion and neck supple.   Lymphadenopathy:      Cervical: No cervical adenopathy.   Skin:     General: Skin is warm and dry.      Findings: No rash.   Neurological:      Mental Status: He is alert and oriented to person, place, and time.      Cranial Nerves: No cranial nerve deficit.      Coordination: Coordination normal.      Gait: Gait normal.   Psychiatric:         Behavior: " Behavior normal.         Thought Content: Thought content normal.         Judgment: Judgment normal.         The following data was reviewed by: Celestine Payne PA-C on 06/17/2025:    Common labs          6/5/2025    08:32   Common Labs   Glucose 106    BUN 16.0    Creatinine 1.16    Sodium 142    Potassium 4.5    Chloride 104    Calcium 9.9    Albumin 4.4    Total Bilirubin 0.5    Alkaline Phosphatase 68    AST (SGOT) 18    ALT (SGPT) 14    WBC 5.11    Hemoglobin 14.9    Hematocrit 44.0    Platelets 271    Total Cholesterol 205    Triglycerides 97    HDL Cholesterol 53    LDL Cholesterol  135    Hemoglobin A1C 5.40    PSA 0.807           Assessment and Plan Additional age appropriate preventative wellness advice topics were discussed during today's preventative wellness exam(some topics already addressed during AWV portion of the note above):    Physical Activity: Advised cardiovascular activity 150 minutes per week as tolerated. (example brisk walk for 30 minutes, 5 days a week).     Nutrition: Discussed nutrition plan with patient. Information shared in after visit summary. Goal is for a well balanced diet to enhance overall health.     Healthy Weight: Discussed current and goal BMI with patient. Steps to attain this goal discussed. Information shared in after visit summary.     Medicare annual wellness visit, subsequent         Benign paroxysmal positional vertigo, unspecified laterality         Gastroesophageal reflux disease, unspecified whether esophagitis present         Mixed hyperlipidemia   Lipid abnormalities are stable    Plan:  Continue same medication/s without change.      Discussed medication dosage, use, side effects, and goals of treatment in detail.    Counseled patient on lifestyle modifications to help control hyperlipidemia.     Patient Treatment Goals:   LDL goal is less than 70    Followup in 6 months.         Encounter for screening for malignant neoplasm of colon       Healthy eating  habits. Low saturated fats. High plant based. Avoid processed foods. 15-30 min of cardiovascular exercise 5 days per week with atleast 2-3 days of resistance training.      Influenza vaccine when due.   Colorectal screening due later this year in November.      I spent 45 minutes caring for Sourav on this date of service. This time includes time spent by me in the following activities:preparing for the visit, reviewing tests, obtaining and/or reviewing a separately obtained history, performing a medically appropriate examination and/or evaluation , counseling and educating the patient/family/caregiver, ordering medications, tests, or procedures, and documenting information in the medical record  Follow Up   Return in about 6 months (around 12/17/2025) for Recheck.  Patient was given instructions and counseling regarding his condition or for health maintenance advice. Please see specific information pulled into the AVS if appropriate.

## 2025-06-23 ENCOUNTER — PATIENT ROUNDING (BHMG ONLY) (OUTPATIENT)
Dept: FAMILY MEDICINE CLINIC | Facility: CLINIC | Age: 70
End: 2025-06-23
Payer: MEDICARE

## 2025-06-23 NOTE — PROGRESS NOTES
June 23, 2025      A [employer] message was sent to Sourav Proctor inquiring about patient's experience at our office during a recent visit.      ARI [place]  [facility]  [address]  [place] [id number].

## 2025-07-22 ENCOUNTER — OFFICE VISIT (OUTPATIENT)
Dept: SLEEP MEDICINE | Facility: CLINIC | Age: 70
End: 2025-07-22
Payer: MEDICARE

## 2025-07-22 VITALS
HEART RATE: 73 BPM | HEIGHT: 67 IN | SYSTOLIC BLOOD PRESSURE: 114 MMHG | DIASTOLIC BLOOD PRESSURE: 71 MMHG | OXYGEN SATURATION: 97 % | WEIGHT: 152 LBS | BODY MASS INDEX: 23.86 KG/M2

## 2025-07-22 DIAGNOSIS — G47.33 OSA ON CPAP: Primary | ICD-10-CM

## 2025-07-22 PROBLEM — G47.19 EXCESSIVE DAYTIME SLEEPINESS: Status: RESOLVED | Noted: 2025-05-06 | Resolved: 2025-07-22

## 2025-07-22 PROCEDURE — 1160F RVW MEDS BY RX/DR IN RCRD: CPT | Performed by: INTERNAL MEDICINE

## 2025-07-22 PROCEDURE — G0463 HOSPITAL OUTPT CLINIC VISIT: HCPCS

## 2025-07-22 PROCEDURE — 99213 OFFICE O/P EST LOW 20 MIN: CPT | Performed by: INTERNAL MEDICINE

## 2025-07-22 PROCEDURE — 1159F MED LIST DOCD IN RCRD: CPT | Performed by: INTERNAL MEDICINE

## 2025-07-22 NOTE — PROGRESS NOTES
"  [facility]  Sleep medicine  [facility], [address]  [place] [address]  Phone   Fax       SLEEP CLINIC FOLLOW UP PROGRESS NOTE.    Sourav Proctor  [id number]   1955  69 y.o.  male      PCP: Celestine Payne PA-C      Date of visit: 7/22/2025    Chief Complaint   Patient presents with    Follow-up    Sleep Apnea       HPI:  This is a 69 y.o. years old patient is here for the management of obstructive sleep apnea.  Sleep apnea is mild to moderate in severity with a AHI of 6/h and in supine 15/hr. Patient is using positive airway pressure therapy with auto CPAP and the symptoms of sleep apnea have improved significantly on the therapy. Normally patient goes to bed at 10 PM and wakes up at 5 AM .  The patient wakes up 2-3 time(s) during the night and has no problem going back to sleep.  Feels refreshed after waking up.    Without the CPAP patient has daytime excessive sleepiness.  This is his first compliance visit after getting a new CPAP.  Medications and allergies are reviewed by me and documented in the encounter.     SOCIAL (habits pertaining to sleep medicine)  History tobacco use:No   History of alcohol use: 4 per week  Caffeine use: 4     REVIEW OF SYSTEMS:   Pertaining positive symptoms are:  Bates Sleepiness Scale :Total score: 7   GERD       PHYSICAL EXAMINATION:  CONSTITUTIONAL:  Vitals:    07/22/25 1300   BP: 114/71   BP Location: Left arm   Patient Position: Sitting   Pulse: 73   SpO2: 97%   Weight: 68.9 kg (152 lb)   Height: 170.2 cm (67\")    Body mass index is 23.81 kg/m².   NOSE: nasal passages are clear, No deformities noted   RESP SYSTEM: Not in any respiratory distress, no chest deformities noted,   CARDIOVASULAR: No edema noted  NEURO: Oriented x 3, gait normal,  Mood and affect appeared appropriate      Data reviewed:  The Smart card downloaded on 7/22/2025 has been reviewed independently by me for compliance and discussed the data with " the patient.   Compliance; 100%  More than 4 hr use, 97%  Average use of the device 7 hours and 20 minutes per night  Residual AHI: 3.6/hr (goal < 5.0 /hr)  Mask type: Fullface mask  Device: ResMed air sense 10  DME: [facility]      ASSESSMENT AND PLAN:  Obstructive sleep apnea ( G 47.33).  The symptoms of sleep apnea have improved with the device and the treatment.  Patient's compliance with the device is excellent for treatment of sleep apnea.  I have independently reviewed the smart card down load and discussed with the patient the download data and encouarged the patient to continue to use the device.The residual AHI is acceptable. The device is benefiting the patient and the device is medically necessary.  Without proper control of sleep apnea and good compliance there is a increased risk for hypertension, diabetes mellitus and nonrestorative sleep with hypersomnia which can increase risk for motor vehicle accidents.  Untreated sleep apnea is also a risk factor for development of atrial fibrillation, pulmonary hypertension, insulin resistance and stroke.    Return in about 1 year (around 7/22/2026) for with smart card down load. . Patient's questions were answered.    7/22/2025  Anastasia Handy MD  Sleep Medicine.  Medical Director,   [facility], [facility] sleep centers.

## 2025-07-29 DIAGNOSIS — K21.9 GASTROESOPHAGEAL REFLUX DISEASE WITHOUT ESOPHAGITIS: ICD-10-CM

## 2025-07-29 RX ORDER — OMEPRAZOLE 40 MG/1
40 CAPSULE, DELAYED RELEASE ORAL DAILY
Qty: 90 CAPSULE | Refills: 3 | Status: SHIPPED | OUTPATIENT
Start: 2025-07-29

## 2025-08-26 ENCOUNTER — OFFICE VISIT (OUTPATIENT)
Dept: PAIN MEDICINE | Facility: CLINIC | Age: 70
End: 2025-08-26
Payer: MEDICARE

## 2025-08-26 VITALS
HEART RATE: 65 BPM | OXYGEN SATURATION: 98 % | WEIGHT: 151 LBS | DIASTOLIC BLOOD PRESSURE: 83 MMHG | RESPIRATION RATE: 16 BRPM | SYSTOLIC BLOOD PRESSURE: 131 MMHG | BODY MASS INDEX: 23.65 KG/M2

## 2025-08-26 DIAGNOSIS — M47.812 CERVICAL SPONDYLOSIS WITHOUT MYELOPATHY: Primary | ICD-10-CM

## 2025-08-26 DIAGNOSIS — M54.12 CERVICAL RADICULITIS: ICD-10-CM

## 2025-08-26 DIAGNOSIS — M79.18 MYOFASCIAL PAIN SYNDROME: ICD-10-CM

## 2025-08-26 RX ORDER — GABAPENTIN 300 MG/1
300 CAPSULE ORAL NIGHTLY PRN
Qty: 90 CAPSULE | Refills: 5 | Status: SHIPPED | OUTPATIENT
Start: 2025-08-26

## 2025-08-26 RX ORDER — MELOXICAM 15 MG/1
15 TABLET ORAL DAILY PRN
Qty: 90 TABLET | Refills: 0 | Status: CANCELLED | OUTPATIENT
Start: 2025-08-26